# Patient Record
Sex: MALE | HISPANIC OR LATINO | Employment: UNEMPLOYED | URBAN - METROPOLITAN AREA
[De-identification: names, ages, dates, MRNs, and addresses within clinical notes are randomized per-mention and may not be internally consistent; named-entity substitution may affect disease eponyms.]

---

## 2021-02-28 ENCOUNTER — APPOINTMENT (OUTPATIENT)
Dept: RADIOLOGY | Facility: MEDICAL CENTER | Age: 41
DRG: 987 | End: 2021-02-28
Payer: MEDICAID

## 2021-02-28 ENCOUNTER — HOSPITAL ENCOUNTER (INPATIENT)
Facility: MEDICAL CENTER | Age: 41
LOS: 3 days | DRG: 987 | End: 2021-03-03
Attending: EMERGENCY MEDICINE | Admitting: SURGERY
Payer: MEDICAID

## 2021-02-28 ENCOUNTER — APPOINTMENT (OUTPATIENT)
Dept: RADIOLOGY | Facility: MEDICAL CENTER | Age: 41
DRG: 987 | End: 2021-02-28
Attending: EMERGENCY MEDICINE
Payer: MEDICAID

## 2021-02-28 DIAGNOSIS — S06.6X0A SUBARACHNOID HEMORRHAGE FOLLOWING INJURY, NO LOSS OF CONSCIOUSNESS, INITIAL ENCOUNTER (HCC): ICD-10-CM

## 2021-02-28 PROBLEM — Z11.9 SCREENING EXAMINATION FOR INFECTIOUS DISEASE: Status: ACTIVE | Noted: 2021-02-28

## 2021-02-28 PROBLEM — S01.01XA SCALP LACERATION, INITIAL ENCOUNTER: Status: ACTIVE | Noted: 2021-02-28

## 2021-02-28 PROBLEM — J96.90 RESPIRATORY FAILURE FOLLOWING TRAUMA (HCC): Status: ACTIVE | Noted: 2021-02-28

## 2021-02-28 PROBLEM — S06.6XAA SUBARACHNOID HEMORRHAGE FOLLOWING INJURY (HCC): Status: ACTIVE | Noted: 2021-02-28

## 2021-02-28 PROBLEM — T14.90XA TRAUMA: Status: ACTIVE | Noted: 2021-02-28

## 2021-02-28 PROBLEM — F10.929 ACUTE ALCOHOL INTOXICATION (HCC): Status: ACTIVE | Noted: 2021-02-28

## 2021-02-28 PROBLEM — Z53.09 CONTRAINDICATION TO DEEP VEIN THROMBOSIS (DVT) PROPHYLAXIS: Status: ACTIVE | Noted: 2021-02-28

## 2021-02-28 LAB
ABO + RH BLD: NORMAL
ABO GROUP BLD: NORMAL
ACTION RANGE TRIGGERED IACRT: YES
ACTION RANGE TRIGGERED IACRT: YES
ALBUMIN SERPL BCP-MCNC: 4.7 G/DL (ref 3.2–4.9)
ALBUMIN/GLOB SERPL: 1.1 G/DL
ALP SERPL-CCNC: 142 U/L (ref 30–99)
ALT SERPL-CCNC: 43 U/L (ref 2–50)
ANION GAP SERPL CALC-SCNC: 19 MMOL/L (ref 7–16)
APTT PPP: 27.6 SEC (ref 24.7–36)
AST SERPL-CCNC: 30 U/L (ref 12–45)
BASE EXCESS BLDA CALC-SCNC: -10 MMOL/L (ref -4–3)
BASE EXCESS BLDA CALC-SCNC: -11 MMOL/L (ref -4–3)
BILIRUB SERPL-MCNC: 0.2 MG/DL (ref 0.1–1.5)
BLD GP AB SCN SERPL QL: NORMAL
BODY TEMPERATURE: ABNORMAL DEGREES
BODY TEMPERATURE: ABNORMAL DEGREES
BUN SERPL-MCNC: 11 MG/DL (ref 8–22)
CALCIUM SERPL-MCNC: 8.8 MG/DL (ref 8.5–10.5)
CFT BLD TEG: 7.5 MIN (ref 5–10)
CHLORIDE SERPL-SCNC: 99 MMOL/L (ref 96–112)
CLOT ANGLE BLD TEG: 49.6 DEGREES (ref 53–72)
CLOT LYSIS 30M P MA LENFR BLD TEG: 0 % (ref 0–8)
CO2 BLDA-SCNC: 18 MMOL/L (ref 20–33)
CO2 BLDA-SCNC: 25 MMOL/L (ref 20–33)
CO2 SERPL-SCNC: 14 MMOL/L (ref 20–33)
CREAT SERPL-MCNC: 0.85 MG/DL (ref 0.5–1.4)
CT.EXTRINSIC BLD ROTEM: 3.5 MIN (ref 1–3)
ERYTHROCYTE [DISTWIDTH] IN BLOOD BY AUTOMATED COUNT: 41.1 FL (ref 35.9–50)
ETHANOL BLD-MCNC: 325.9 MG/DL (ref 0–10)
GLOBULIN SER CALC-MCNC: 4.2 G/DL (ref 1.9–3.5)
GLUCOSE SERPL-MCNC: 349 MG/DL (ref 65–99)
HCO3 BLDA-SCNC: 17 MMOL/L (ref 17–25)
HCO3 BLDA-SCNC: 22.7 MMOL/L (ref 17–25)
HCT VFR BLD AUTO: 54.6 % (ref 42–52)
HGB BLD-MCNC: 18.7 G/DL (ref 14–18)
HOROWITZ INDEX BLDA+IHG-RTO: 107 MM[HG]
HOROWITZ INDEX BLDA+IHG-RTO: 243 MM[HG]
INR PPP: 0.98 (ref 0.87–1.13)
INST. QUALIFIED PATIENT IIQPT: YES
INST. QUALIFIED PATIENT IIQPT: YES
LACTATE BLD-SCNC: 2.9 MMOL/L (ref 0.5–2)
MCF BLD TEG: 61.6 MM (ref 50–70)
MCH RBC QN AUTO: 32.4 PG (ref 27–33)
MCHC RBC AUTO-ENTMCNC: 34.2 G/DL (ref 33.7–35.3)
MCV RBC AUTO: 94.6 FL (ref 81.4–97.8)
O2/TOTAL GAS SETTING VFR VENT: 100 %
O2/TOTAL GAS SETTING VFR VENT: 100 %
PA AA BLD-ACNC: 18.8 %
PA ADP BLD-ACNC: 52.2 %
PCO2 BLDA: 42.2 MMHG (ref 26–37)
PCO2 BLDA: 81.3 MMHG (ref 26–37)
PCO2 TEMP ADJ BLDA: 41 MMHG (ref 26–37)
PH BLDA: 7.05 [PH] (ref 7.4–7.5)
PH BLDA: 7.21 [PH] (ref 7.4–7.5)
PH TEMP ADJ BLDA: 7.22 [PH] (ref 7.4–7.5)
PLATELET # BLD AUTO: 278 K/UL (ref 164–446)
PMV BLD AUTO: 10.6 FL (ref 9–12.9)
PO2 BLDA: 107 MMHG (ref 64–87)
PO2 BLDA: 243 MMHG (ref 64–87)
PO2 TEMP ADJ BLDA: 239 MMHG (ref 64–87)
POTASSIUM SERPL-SCNC: 5.1 MMOL/L (ref 3.6–5.5)
PROT SERPL-MCNC: 8.9 G/DL (ref 6–8.2)
PROTHROMBIN TIME: 13.3 SEC (ref 12–14.6)
RBC # BLD AUTO: 5.77 M/UL (ref 4.7–6.1)
RH BLD: NORMAL
SAO2 % BLDA: 100 % (ref 93–99)
SAO2 % BLDA: 95 % (ref 93–99)
SARS-COV+SARS-COV-2 AG RESP QL IA.RAPID: NOTDETECTED
SODIUM SERPL-SCNC: 132 MMOL/L (ref 135–145)
SPECIMEN DRAWN FROM PATIENT: ABNORMAL
SPECIMEN DRAWN FROM PATIENT: ABNORMAL
SPECIMEN SOURCE: NORMAL
TEG ALGORITHM TGALG: ABNORMAL
WBC # BLD AUTO: 7.7 K/UL (ref 4.8–10.8)

## 2021-02-28 PROCEDURE — U0003 INFECTIOUS AGENT DETECTION BY NUCLEIC ACID (DNA OR RNA); SEVERE ACUTE RESPIRATORY SYNDROME CORONAVIRUS 2 (SARS-COV-2) (CORONAVIRUS DISEASE [COVID-19]), AMPLIFIED PROBE TECHNIQUE, MAKING USE OF HIGH THROUGHPUT TECHNOLOGIES AS DESCRIBED BY CMS-2020-01-R: HCPCS

## 2021-02-28 PROCEDURE — 5A1935Z RESPIRATORY VENTILATION, LESS THAN 24 CONSECUTIVE HOURS: ICD-10-PCS | Performed by: SURGERY

## 2021-02-28 PROCEDURE — U0005 INFEC AGEN DETEC AMPLI PROBE: HCPCS

## 2021-02-28 PROCEDURE — 86901 BLOOD TYPING SEROLOGIC RH(D): CPT

## 2021-02-28 PROCEDURE — 99285 EMERGENCY DEPT VISIT HI MDM: CPT

## 2021-02-28 PROCEDURE — 72125 CT NECK SPINE W/O DYE: CPT

## 2021-02-28 PROCEDURE — 87426 SARSCOV CORONAVIRUS AG IA: CPT

## 2021-02-28 PROCEDURE — 85384 FIBRINOGEN ACTIVITY: CPT

## 2021-02-28 PROCEDURE — 305949 HCHG RED TRAUMA ACT PRE-NOTIFY NO CC

## 2021-02-28 PROCEDURE — 86900 BLOOD TYPING SEROLOGIC ABO: CPT

## 2021-02-28 PROCEDURE — 94002 VENT MGMT INPAT INIT DAY: CPT

## 2021-02-28 PROCEDURE — 302214 INTUBATION BOX: Performed by: EMERGENCY MEDICINE

## 2021-02-28 PROCEDURE — 85347 COAGULATION TIME ACTIVATED: CPT

## 2021-02-28 PROCEDURE — 86850 RBC ANTIBODY SCREEN: CPT

## 2021-02-28 PROCEDURE — 85610 PROTHROMBIN TIME: CPT

## 2021-02-28 PROCEDURE — 700105 HCHG RX REV CODE 258: Performed by: SURGERY

## 2021-02-28 PROCEDURE — 71045 X-RAY EXAM CHEST 1 VIEW: CPT

## 2021-02-28 PROCEDURE — 83605 ASSAY OF LACTIC ACID: CPT

## 2021-02-28 PROCEDURE — 85730 THROMBOPLASTIN TIME PARTIAL: CPT

## 2021-02-28 PROCEDURE — 770022 HCHG ROOM/CARE - ICU (200)

## 2021-02-28 PROCEDURE — 70450 CT HEAD/BRAIN W/O DYE: CPT

## 2021-02-28 PROCEDURE — 36600 WITHDRAWAL OF ARTERIAL BLOOD: CPT

## 2021-02-28 PROCEDURE — 0HQ0XZZ REPAIR SCALP SKIN, EXTERNAL APPROACH: ICD-10-PCS | Performed by: SURGERY

## 2021-02-28 PROCEDURE — 85027 COMPLETE CBC AUTOMATED: CPT

## 2021-02-28 PROCEDURE — 80053 COMPREHEN METABOLIC PANEL: CPT

## 2021-02-28 PROCEDURE — 700111 HCHG RX REV CODE 636 W/ 250 OVERRIDE (IP): Performed by: SURGERY

## 2021-02-28 PROCEDURE — 82077 ASSAY SPEC XCP UR&BREATH IA: CPT

## 2021-02-28 PROCEDURE — 82803 BLOOD GASES ANY COMBINATION: CPT | Mod: 91

## 2021-02-28 PROCEDURE — 0J900ZZ DRAINAGE OF SCALP SUBCUTANEOUS TISSUE AND FASCIA, OPEN APPROACH: ICD-10-PCS | Performed by: SURGERY

## 2021-02-28 PROCEDURE — 85576 BLOOD PLATELET AGGREGATION: CPT

## 2021-02-28 RX ORDER — ONDANSETRON 2 MG/ML
4 INJECTION INTRAMUSCULAR; INTRAVENOUS EVERY 4 HOURS PRN
Status: DISCONTINUED | OUTPATIENT
Start: 2021-02-28 | End: 2021-03-03 | Stop reason: HOSPADM

## 2021-02-28 RX ORDER — ENEMA 19; 7 G/133ML; G/133ML
1 ENEMA RECTAL
Status: DISCONTINUED | OUTPATIENT
Start: 2021-02-28 | End: 2021-03-03 | Stop reason: HOSPADM

## 2021-02-28 RX ORDER — OXYCODONE HYDROCHLORIDE 5 MG/1
5 TABLET ORAL
Status: DISCONTINUED | OUTPATIENT
Start: 2021-02-28 | End: 2021-03-03 | Stop reason: HOSPADM

## 2021-02-28 RX ORDER — LABETALOL HYDROCHLORIDE 5 MG/ML
10 INJECTION, SOLUTION INTRAVENOUS EVERY 4 HOURS PRN
Status: DISCONTINUED | OUTPATIENT
Start: 2021-02-28 | End: 2021-03-02

## 2021-02-28 RX ORDER — FAMOTIDINE 20 MG/1
20 TABLET, FILM COATED ORAL 2 TIMES DAILY
Status: DISCONTINUED | OUTPATIENT
Start: 2021-02-28 | End: 2021-02-28

## 2021-02-28 RX ORDER — AMOXICILLIN 250 MG
1 CAPSULE ORAL NIGHTLY
Status: DISCONTINUED | OUTPATIENT
Start: 2021-02-28 | End: 2021-03-03 | Stop reason: HOSPADM

## 2021-02-28 RX ORDER — FAMOTIDINE 20 MG/1
20 TABLET, FILM COATED ORAL 2 TIMES DAILY
Status: DISCONTINUED | OUTPATIENT
Start: 2021-02-28 | End: 2021-03-02

## 2021-02-28 RX ORDER — PROPOFOL 10 MG/ML
200 INJECTION, EMULSION INTRAVENOUS ONCE
Status: DISCONTINUED | OUTPATIENT
Start: 2021-02-28 | End: 2021-02-28

## 2021-02-28 RX ORDER — BISACODYL 10 MG
10 SUPPOSITORY, RECTAL RECTAL
Status: DISCONTINUED | OUTPATIENT
Start: 2021-02-28 | End: 2021-03-03 | Stop reason: HOSPADM

## 2021-02-28 RX ORDER — MORPHINE SULFATE 4 MG/ML
4 INJECTION, SOLUTION INTRAMUSCULAR; INTRAVENOUS
Status: DISCONTINUED | OUTPATIENT
Start: 2021-02-28 | End: 2021-03-02

## 2021-02-28 RX ORDER — SODIUM CHLORIDE 9 MG/ML
INJECTION, SOLUTION INTRAVENOUS CONTINUOUS
Status: DISCONTINUED | OUTPATIENT
Start: 2021-02-28 | End: 2021-03-02

## 2021-02-28 RX ORDER — AMOXICILLIN 250 MG
1 CAPSULE ORAL
Status: DISCONTINUED | OUTPATIENT
Start: 2021-02-28 | End: 2021-03-03 | Stop reason: HOSPADM

## 2021-02-28 RX ORDER — OXYCODONE HYDROCHLORIDE 10 MG/1
10 TABLET ORAL
Status: DISCONTINUED | OUTPATIENT
Start: 2021-02-28 | End: 2021-03-02

## 2021-02-28 RX ORDER — DOCUSATE SODIUM 100 MG/1
100 CAPSULE, LIQUID FILLED ORAL 2 TIMES DAILY
Status: DISCONTINUED | OUTPATIENT
Start: 2021-02-28 | End: 2021-03-03 | Stop reason: HOSPADM

## 2021-02-28 RX ORDER — POLYETHYLENE GLYCOL 3350 17 G/17G
1 POWDER, FOR SOLUTION ORAL 2 TIMES DAILY
Status: DISCONTINUED | OUTPATIENT
Start: 2021-02-28 | End: 2021-03-03 | Stop reason: HOSPADM

## 2021-02-28 RX ADMIN — MORPHINE SULFATE 4 MG: 4 INJECTION INTRAVENOUS at 21:53

## 2021-02-28 RX ADMIN — FAMOTIDINE 20 MG: 10 INJECTION INTRAVENOUS at 21:48

## 2021-02-28 RX ADMIN — PROPOFOL 10 MCG/KG/MIN: 10 INJECTION, EMULSION INTRAVENOUS at 21:15

## 2021-02-28 RX ADMIN — SODIUM CHLORIDE: 9 INJECTION, SOLUTION INTRAVENOUS at 21:47

## 2021-02-28 ASSESSMENT — PAIN DESCRIPTION - PAIN TYPE
TYPE: ACUTE PAIN

## 2021-02-28 ASSESSMENT — FIBROSIS 4 INDEX: FIB4 SCORE: 0.69

## 2021-03-01 ENCOUNTER — APPOINTMENT (OUTPATIENT)
Dept: RADIOLOGY | Facility: MEDICAL CENTER | Age: 41
DRG: 987 | End: 2021-03-01
Attending: SURGERY
Payer: MEDICAID

## 2021-03-01 ENCOUNTER — APPOINTMENT (OUTPATIENT)
Dept: RADIOLOGY | Facility: MEDICAL CENTER | Age: 41
DRG: 987 | End: 2021-03-01
Attending: NEUROLOGICAL SURGERY
Payer: MEDICAID

## 2021-03-01 PROBLEM — E11.9 DIABETES (HCC): Status: ACTIVE | Noted: 2021-03-01

## 2021-03-01 PROBLEM — R73.9 HYPERGLYCEMIA: Status: ACTIVE | Noted: 2021-03-01

## 2021-03-01 PROBLEM — J98.11 BILATERAL ATELECTASIS: Status: ACTIVE | Noted: 2021-03-01

## 2021-03-01 LAB
ACTION RANGE TRIGGERED IACRT: YES
ALBUMIN SERPL BCP-MCNC: 3.8 G/DL (ref 3.2–4.9)
ALBUMIN/GLOB SERPL: 1.3 G/DL
ALP SERPL-CCNC: 75 U/L (ref 30–99)
ALT SERPL-CCNC: 38 U/L (ref 2–50)
ANION GAP SERPL CALC-SCNC: 17 MMOL/L (ref 7–16)
AST SERPL-CCNC: 31 U/L (ref 12–45)
BASE EXCESS BLDA CALC-SCNC: -9 MMOL/L (ref -4–3)
BASOPHILS # BLD AUTO: 0.2 % (ref 0–1.8)
BASOPHILS # BLD AUTO: 0.5 % (ref 0–1.8)
BASOPHILS # BLD: 0.03 K/UL (ref 0–0.12)
BASOPHILS # BLD: 0.11 K/UL (ref 0–0.12)
BILIRUB SERPL-MCNC: 0.2 MG/DL (ref 0.1–1.5)
BODY TEMPERATURE: ABNORMAL DEGREES
BUN SERPL-MCNC: 11 MG/DL (ref 8–22)
CALCIUM SERPL-MCNC: 7.6 MG/DL (ref 8.5–10.5)
CHLORIDE SERPL-SCNC: 105 MMOL/L (ref 96–112)
CO2 BLDA-SCNC: 18 MMOL/L (ref 20–33)
CO2 SERPL-SCNC: 16 MMOL/L (ref 20–33)
CREAT SERPL-MCNC: 1.11 MG/DL (ref 0.5–1.4)
EOSINOPHIL # BLD AUTO: 0 K/UL (ref 0–0.51)
EOSINOPHIL # BLD AUTO: 0.01 K/UL (ref 0–0.51)
EOSINOPHIL NFR BLD: 0 % (ref 0–6.9)
EOSINOPHIL NFR BLD: 0 % (ref 0–6.9)
ERYTHROCYTE [DISTWIDTH] IN BLOOD BY AUTOMATED COUNT: 42.5 FL (ref 35.9–50)
ERYTHROCYTE [DISTWIDTH] IN BLOOD BY AUTOMATED COUNT: 42.5 FL (ref 35.9–50)
GLOBULIN SER CALC-MCNC: 2.9 G/DL (ref 1.9–3.5)
GLUCOSE BLD-MCNC: 206 MG/DL (ref 65–99)
GLUCOSE BLD-MCNC: 221 MG/DL (ref 65–99)
GLUCOSE BLD-MCNC: 256 MG/DL (ref 65–99)
GLUCOSE BLD-MCNC: 333 MG/DL (ref 65–99)
GLUCOSE SERPL-MCNC: 262 MG/DL (ref 65–99)
HCO3 BLDA-SCNC: 16.5 MMOL/L (ref 17–25)
HCT VFR BLD AUTO: 43.6 % (ref 42–52)
HCT VFR BLD AUTO: 48.6 % (ref 42–52)
HGB BLD-MCNC: 14.7 G/DL (ref 14–18)
HGB BLD-MCNC: 16.8 G/DL (ref 14–18)
HOROWITZ INDEX BLDA+IHG-RTO: 303 MM[HG]
IMM GRANULOCYTES # BLD AUTO: 0.13 K/UL (ref 0–0.11)
IMM GRANULOCYTES # BLD AUTO: 0.35 K/UL (ref 0–0.11)
IMM GRANULOCYTES NFR BLD AUTO: 0.8 % (ref 0–0.9)
IMM GRANULOCYTES NFR BLD AUTO: 1.5 % (ref 0–0.9)
INST. QUALIFIED PATIENT IIQPT: YES
LYMPHOCYTES # BLD AUTO: 1.64 K/UL (ref 1–4.8)
LYMPHOCYTES # BLD AUTO: 2.81 K/UL (ref 1–4.8)
LYMPHOCYTES NFR BLD: 11.9 % (ref 22–41)
LYMPHOCYTES NFR BLD: 9.8 % (ref 22–41)
MAGNESIUM SERPL-MCNC: 2 MG/DL (ref 1.5–2.5)
MCH RBC QN AUTO: 32.4 PG (ref 27–33)
MCH RBC QN AUTO: 33.1 PG (ref 27–33)
MCHC RBC AUTO-ENTMCNC: 33.7 G/DL (ref 33.7–35.3)
MCHC RBC AUTO-ENTMCNC: 34.6 G/DL (ref 33.7–35.3)
MCV RBC AUTO: 95.7 FL (ref 81.4–97.8)
MCV RBC AUTO: 96 FL (ref 81.4–97.8)
MONOCYTES # BLD AUTO: 1.1 K/UL (ref 0–0.85)
MONOCYTES # BLD AUTO: 1.59 K/UL (ref 0–0.85)
MONOCYTES NFR BLD AUTO: 6.6 % (ref 0–13.4)
MONOCYTES NFR BLD AUTO: 6.7 % (ref 0–13.4)
NEUTROPHILS # BLD AUTO: 13.86 K/UL (ref 1.82–7.42)
NEUTROPHILS # BLD AUTO: 18.76 K/UL (ref 1.82–7.42)
NEUTROPHILS NFR BLD: 79.4 % (ref 44–72)
NEUTROPHILS NFR BLD: 82.6 % (ref 44–72)
NRBC # BLD AUTO: 0 K/UL
NRBC # BLD AUTO: 0 K/UL
NRBC BLD-RTO: 0 /100 WBC
NRBC BLD-RTO: 0 /100 WBC
O2/TOTAL GAS SETTING VFR VENT: 60 %
PCO2 BLDA: 34.9 MMHG (ref 26–37)
PCO2 TEMP ADJ BLDA: 34.6 MMHG (ref 26–37)
PH BLDA: 7.28 [PH] (ref 7.4–7.5)
PH TEMP ADJ BLDA: 7.29 [PH] (ref 7.4–7.5)
PHOSPHATE SERPL-MCNC: 3.9 MG/DL (ref 2.5–4.5)
PLATELET # BLD AUTO: 257 K/UL (ref 164–446)
PLATELET # BLD AUTO: 324 K/UL (ref 164–446)
PMV BLD AUTO: 10.3 FL (ref 9–12.9)
PMV BLD AUTO: 10.5 FL (ref 9–12.9)
PO2 BLDA: 182 MMHG (ref 64–87)
PO2 TEMP ADJ BLDA: 181 MMHG (ref 64–87)
POTASSIUM SERPL-SCNC: 4.3 MMOL/L (ref 3.6–5.5)
PROT SERPL-MCNC: 6.7 G/DL (ref 6–8.2)
RBC # BLD AUTO: 4.54 M/UL (ref 4.7–6.1)
RBC # BLD AUTO: 5.08 M/UL (ref 4.7–6.1)
SAO2 % BLDA: 99 % (ref 93–99)
SARS-COV-2 RNA RESP QL NAA+PROBE: NOTDETECTED
SODIUM SERPL-SCNC: 138 MMOL/L (ref 135–145)
SPECIMEN DRAWN FROM PATIENT: ABNORMAL
SPECIMEN SOURCE: NORMAL
WBC # BLD AUTO: 16.8 K/UL (ref 4.8–10.8)
WBC # BLD AUTO: 23.6 K/UL (ref 4.8–10.8)

## 2021-03-01 PROCEDURE — 85025 COMPLETE CBC W/AUTO DIFF WBC: CPT

## 2021-03-01 PROCEDURE — 71045 X-RAY EXAM CHEST 1 VIEW: CPT

## 2021-03-01 PROCEDURE — 700102 HCHG RX REV CODE 250 W/ 637 OVERRIDE(OP): Performed by: SURGERY

## 2021-03-01 PROCEDURE — 82962 GLUCOSE BLOOD TEST: CPT

## 2021-03-01 PROCEDURE — 94003 VENT MGMT INPAT SUBQ DAY: CPT

## 2021-03-01 PROCEDURE — 94150 VITAL CAPACITY TEST: CPT

## 2021-03-01 PROCEDURE — 83735 ASSAY OF MAGNESIUM: CPT

## 2021-03-01 PROCEDURE — 700117 HCHG RX CONTRAST REV CODE 255: Performed by: NEUROLOGICAL SURGERY

## 2021-03-01 PROCEDURE — 70496 CT ANGIOGRAPHY HEAD: CPT

## 2021-03-01 PROCEDURE — A9270 NON-COVERED ITEM OR SERVICE: HCPCS | Performed by: SURGERY

## 2021-03-01 PROCEDURE — 700111 HCHG RX REV CODE 636 W/ 250 OVERRIDE (IP): Performed by: SURGERY

## 2021-03-01 PROCEDURE — 84100 ASSAY OF PHOSPHORUS: CPT

## 2021-03-01 PROCEDURE — 71260 CT THORAX DX C+: CPT

## 2021-03-01 PROCEDURE — 770022 HCHG ROOM/CARE - ICU (200)

## 2021-03-01 PROCEDURE — 700105 HCHG RX REV CODE 258: Performed by: SURGERY

## 2021-03-01 PROCEDURE — 82803 BLOOD GASES ANY COMBINATION: CPT

## 2021-03-01 PROCEDURE — 94799 UNLISTED PULMONARY SVC/PX: CPT

## 2021-03-01 PROCEDURE — 80053 COMPREHEN METABOLIC PANEL: CPT

## 2021-03-01 PROCEDURE — 99291 CRITICAL CARE FIRST HOUR: CPT | Performed by: SURGERY

## 2021-03-01 PROCEDURE — 700101 HCHG RX REV CODE 250: Performed by: SURGERY

## 2021-03-01 PROCEDURE — 36600 WITHDRAWAL OF ARTERIAL BLOOD: CPT

## 2021-03-01 RX ORDER — CHLORDIAZEPOXIDE HYDROCHLORIDE 25 MG/1
50 CAPSULE, GELATIN COATED ORAL EVERY 6 HOURS
Status: COMPLETED | OUTPATIENT
Start: 2021-03-01 | End: 2021-03-02

## 2021-03-01 RX ORDER — CHLORDIAZEPOXIDE HYDROCHLORIDE 25 MG/1
25 CAPSULE, GELATIN COATED ORAL EVERY 6 HOURS
Status: DISCONTINUED | OUTPATIENT
Start: 2021-03-02 | End: 2021-03-03 | Stop reason: HOSPADM

## 2021-03-01 RX ORDER — LORAZEPAM 2 MG/ML
4 INJECTION INTRAMUSCULAR
Status: DISCONTINUED | OUTPATIENT
Start: 2021-03-01 | End: 2021-03-02

## 2021-03-01 RX ORDER — TAMSULOSIN HYDROCHLORIDE 0.4 MG/1
0.4 CAPSULE ORAL
Status: DISCONTINUED | OUTPATIENT
Start: 2021-03-01 | End: 2021-03-03 | Stop reason: HOSPADM

## 2021-03-01 RX ORDER — LORAZEPAM 2 MG/ML
2 INJECTION INTRAMUSCULAR
Status: DISCONTINUED | OUTPATIENT
Start: 2021-03-01 | End: 2021-03-02

## 2021-03-01 RX ORDER — SODIUM CHLORIDE 9 MG/ML
1000 INJECTION, SOLUTION INTRAVENOUS ONCE
Status: COMPLETED | OUTPATIENT
Start: 2021-03-01 | End: 2021-03-01

## 2021-03-01 RX ORDER — MIDAZOLAM HYDROCHLORIDE 1 MG/ML
1-5 INJECTION INTRAMUSCULAR; INTRAVENOUS
Status: DISCONTINUED | OUTPATIENT
Start: 2021-03-01 | End: 2021-03-01

## 2021-03-01 RX ORDER — LORAZEPAM 2 MG/ML
1 INJECTION INTRAMUSCULAR
Status: DISCONTINUED | OUTPATIENT
Start: 2021-03-01 | End: 2021-03-02

## 2021-03-01 RX ORDER — LORAZEPAM 2 MG/ML
3 INJECTION INTRAMUSCULAR
Status: DISCONTINUED | OUTPATIENT
Start: 2021-03-01 | End: 2021-03-02

## 2021-03-01 RX ORDER — DEXTROSE MONOHYDRATE 25 G/50ML
50 INJECTION, SOLUTION INTRAVENOUS
Status: DISCONTINUED | OUTPATIENT
Start: 2021-03-01 | End: 2021-03-02

## 2021-03-01 RX ADMIN — INSULIN HUMAN 5 UNITS: 100 INJECTION, SOLUTION PARENTERAL at 05:30

## 2021-03-01 RX ADMIN — INSULIN HUMAN 3 UNITS: 100 INJECTION, SOLUTION PARENTERAL at 12:09

## 2021-03-01 RX ADMIN — FAMOTIDINE 20 MG: 20 TABLET ORAL at 17:13

## 2021-03-01 RX ADMIN — MORPHINE SULFATE 4 MG: 4 INJECTION INTRAVENOUS at 05:32

## 2021-03-01 RX ADMIN — OXYCODONE 5 MG: 5 TABLET ORAL at 15:46

## 2021-03-01 RX ADMIN — MORPHINE SULFATE 4 MG: 4 INJECTION INTRAVENOUS at 03:44

## 2021-03-01 RX ADMIN — DOCUSATE SODIUM 50 MG AND SENNOSIDES 8.6 MG 1 TABLET: 8.6; 5 TABLET, FILM COATED ORAL at 17:09

## 2021-03-01 RX ADMIN — INSULIN HUMAN 3 UNITS: 100 INJECTION, SOLUTION PARENTERAL at 17:14

## 2021-03-01 RX ADMIN — FAMOTIDINE 20 MG: 10 INJECTION INTRAVENOUS at 05:32

## 2021-03-01 RX ADMIN — MIDAZOLAM HYDROCHLORIDE 5 MG: 1 INJECTION, SOLUTION INTRAMUSCULAR; INTRAVENOUS at 05:32

## 2021-03-01 RX ADMIN — INSULIN HUMAN 6 UNITS: 100 INJECTION, SOLUTION PARENTERAL at 00:49

## 2021-03-01 RX ADMIN — PROPOFOL 50 MCG/KG/MIN: 10 INJECTION, EMULSION INTRAVENOUS at 05:41

## 2021-03-01 RX ADMIN — PROPOFOL 25 MCG/KG/MIN: 10 INJECTION, EMULSION INTRAVENOUS at 00:52

## 2021-03-01 RX ADMIN — TAMSULOSIN HYDROCHLORIDE 0.4 MG: 0.4 CAPSULE ORAL at 15:46

## 2021-03-01 RX ADMIN — MIDAZOLAM HYDROCHLORIDE 5 MG: 1 INJECTION, SOLUTION INTRAMUSCULAR; INTRAVENOUS at 00:42

## 2021-03-01 RX ADMIN — MORPHINE SULFATE 4 MG: 4 INJECTION INTRAVENOUS at 02:09

## 2021-03-01 RX ADMIN — MIDAZOLAM HYDROCHLORIDE 5 MG: 1 INJECTION, SOLUTION INTRAMUSCULAR; INTRAVENOUS at 04:23

## 2021-03-01 RX ADMIN — SODIUM CHLORIDE 1000 ML: 9 INJECTION, SOLUTION INTRAVENOUS at 00:40

## 2021-03-01 RX ADMIN — CHLORDIAZEPOXIDE HYDROCHLORIDE 50 MG: 25 CAPSULE ORAL at 12:13

## 2021-03-01 RX ADMIN — SODIUM CHLORIDE 1000 ML: 9 INJECTION, SOLUTION INTRAVENOUS at 00:53

## 2021-03-01 RX ADMIN — IOHEXOL 100 ML: 350 INJECTION, SOLUTION INTRAVENOUS at 01:12

## 2021-03-01 RX ADMIN — MIDAZOLAM HYDROCHLORIDE 5 MG: 1 INJECTION, SOLUTION INTRAMUSCULAR; INTRAVENOUS at 08:34

## 2021-03-01 RX ADMIN — CHLORDIAZEPOXIDE HYDROCHLORIDE 50 MG: 25 CAPSULE ORAL at 17:13

## 2021-03-01 RX ADMIN — POLYETHYLENE GLYCOL 3350 1 PACKET: 17 POWDER, FOR SOLUTION ORAL at 17:13

## 2021-03-01 RX ADMIN — OXYCODONE 5 MG: 5 TABLET ORAL at 19:50

## 2021-03-01 RX ADMIN — OXYCODONE 5 MG: 5 TABLET ORAL at 23:51

## 2021-03-01 RX ADMIN — LORAZEPAM 4 MG: 2 INJECTION INTRAMUSCULAR; INTRAVENOUS at 10:30

## 2021-03-01 RX ADMIN — PROPOFOL 70 MCG/KG/MIN: 10 INJECTION, EMULSION INTRAVENOUS at 08:33

## 2021-03-01 RX ADMIN — MORPHINE SULFATE 4 MG: 4 INJECTION INTRAVENOUS at 07:30

## 2021-03-01 RX ADMIN — SODIUM CHLORIDE 1000 ML: 9 INJECTION, SOLUTION INTRAVENOUS at 02:57

## 2021-03-01 RX ADMIN — MIDAZOLAM HYDROCHLORIDE 2 MG: 1 INJECTION, SOLUTION INTRAMUSCULAR; INTRAVENOUS at 02:06

## 2021-03-01 RX ADMIN — SODIUM CHLORIDE: 9 INJECTION, SOLUTION INTRAVENOUS at 19:50

## 2021-03-01 RX ADMIN — CHLORDIAZEPOXIDE HYDROCHLORIDE 50 MG: 25 CAPSULE ORAL at 23:51

## 2021-03-01 ASSESSMENT — LIFESTYLE VARIABLES
VISUAL DISTURBANCES: NOT PRESENT
TOTAL SCORE: MODERATE ITCHING, PINS AND NEEDLES SENSATION, BURNING OR NUMBNESS
TOTAL SCORE: 3
HEADACHE, FULLNESS IN HEAD: NOT PRESENT
AUDITORY DISTURBANCES: MODERATE HARSHNESS OR ABILITY TO FRIGHTEN
HEADACHE, FULLNESS IN HEAD: MODERATELY SEVERE
NAUSEA AND VOMITING: *
ORIENTATION AND CLOUDING OF SENSORIUM: CANNOT DO SERIAL ADDITIONS OR IS UNCERTAIN ABOUT DATE
ORIENTATION AND CLOUDING OF SENSORIUM: DISORIENTED FOR PLACE AND / OR PERSON
VISUAL DISTURBANCES: MODERATELY SEVERE HALLUCINATIONS
AUDITORY DISTURBANCES: NOT PRESENT
AGITATION: NORMAL ACTIVITY
TREMOR: TREMOR NOT VISIBLE BUT CAN BE FELT, FINGERTIP TO FINGERTIP
PAROXYSMAL SWEATS: *
AGITATION: NORMAL ACTIVITY
AGITATION: *
PAROXYSMAL SWEATS: NO SWEAT VISIBLE
TREMOR: *
TOTAL SCORE: 3
AUDITORY DISTURBANCES: NOT PRESENT
NAUSEA AND VOMITING: NO NAUSEA AND NO VOMITING
PAROXYSMAL SWEATS: NO SWEAT VISIBLE
VISUAL DISTURBANCES: NOT PRESENT
HEADACHE, FULLNESS IN HEAD: NOT PRESENT
ORIENTATION AND CLOUDING OF SENSORIUM: CANNOT DO SERIAL ADDITIONS OR IS UNCERTAIN ABOUT DATE
ANXIETY: *
NAUSEA AND VOMITING: NO NAUSEA AND NO VOMITING
ANXIETY: MILDLY ANXIOUS
TREMOR: TREMOR NOT VISIBLE BUT CAN BE FELT, FINGERTIP TO FINGERTIP
ANXIETY: MILDLY ANXIOUS
TOTAL SCORE: 37

## 2021-03-01 ASSESSMENT — PAIN DESCRIPTION - PAIN TYPE
TYPE: ACUTE PAIN

## 2021-03-01 ASSESSMENT — PULMONARY FUNCTION TESTS: FVC: 1

## 2021-03-01 ASSESSMENT — FIBROSIS 4 INDEX: FIB4 SCORE: 0.82

## 2021-03-01 ASSESSMENT — ENCOUNTER SYMPTOMS
HEADACHES: 1
TREMORS: 0
WEAKNESS: 0
RESPIRATORY NEGATIVE: 1
DIZZINESS: 1
GASTROINTESTINAL NEGATIVE: 1
NUMBNESS: 0

## 2021-03-01 ASSESSMENT — PATIENT HEALTH QUESTIONNAIRE - PHQ9
1. LITTLE INTEREST OR PLEASURE IN DOING THINGS: NOT AT ALL
2. FEELING DOWN, DEPRESSED, IRRITABLE, OR HOPELESS: NOT AT ALL
SUM OF ALL RESPONSES TO PHQ9 QUESTIONS 1 AND 2: 0

## 2021-03-01 NOTE — PROGRESS NOTES
Per Telephone with Dr Humphrey, pt may transfer to oncology, maintain NPO status, D/C albumin, do NOT give dulcolax supp at this time.

## 2021-03-01 NOTE — PROGRESS NOTES
Trauma Progress Note 3/1/2021 5:31 AM    This is a 40 y.o. male who reportedly fell after exiting his car in the parking lot striking his head. He is intoxicated. EMS intubated the patient on the scene for his altered mental status. He sustained small subarachnoid hemorrhage.    Plan:   - continue neuro checks     Assessment: intubated, sedated, agitated off sedation, following commands at times    /59   Pulse (!) 107   Temp 36.6 °C (97.8 °F) (Temporal)   Resp (!) 21   Ht 1.829 m (6')   Wt 102 kg (223 lb 15.8 oz)   SpO2 97%   BMI 30.38 kg/m²     Hemoglobin: 14.7 g/dL  Hematocrit: 43.6 %    Lactic Acid: From 2.9 mmol/L to pending     Urine Output: ovalles catheter / adequate output    Arterial Blood Gas:  Recent Labs     02/28/21  2133 02/28/21  2329 03/01/21  0336   ISTATAPH 7.053* 7.212* 7.283*   ISTATAPCO2 81.3* 42.2* 34.9   ISTATAPO2 107* 243* 182*   ISTATATCO2 25 18* 18*   TRFEOFT3ITW 95 100* 99   ISTATARTHCO3 22.7 17.0 16.5*   ISTATARTBE -10* -11* -9*   ISTATTEMP see below 97.4 F 98.2 F   ISTATFIO2 100 100 60   ISTATSPEC Arterial Arterial Arterial   ISTATAPHTC  --  7.221* 7.286*   GMYBOPMD4TG  --  239* 181*     Recent Labs     02/28/21 2054   APTT 27.6   INR 0.98      Recent Labs     02/28/21 2059   REACTMIN 7.5   CLOTKINET 3.5*   CLOTANGL 49.6*   MAXCLOTS 61.6   BHY69GHB 0.0   PRCINADP 52.2   PRCINAA 18.8       Subarachnoid hemorrhage following injury (HCC)- (present on admission)  Assessment & Plan  Subtle subarachnoid hemorrhage or minimal contusion in left vertex.  Non-operative management.   Follow up CTA of brain with slightly more pronounced SAH  Post traumatic pharmacologic seizure prophylaxis for 1 week.  Speech Language Pathology cognitive evaluation.  Porfirio Lea MD. Neurosurgeon. [facility].    Respiratory failure following trauma (HCC)- (present on admission)  Assessment & Plan  Intubated at the scene for low GCS.  Continue full mechanical ventilatory support.  Ventilator bundle and Trauma weaning protocol.    Hyperglycemia- (present on admission)  Assessment & Plan  Admission plasma glucose 349.  Unknown history at this time.  Insulin sliding scale.    Bilateral atelectasis- (present on admission)  Assessment & Plan  Atelectasis in the right upper lobe and left lung base.  Supplemental oxygen to maintain O2 saturations greater than 95%  Aggressive pulmonary hygiene. Serial chest radiographs.    Diabetes (HCC)  Assessment & Plan  Elevated blood glucose; history unknown  Medium sliding scale insulin ordered    Acute alcohol intoxication (HCC)- (present on admission)  Assessment & Plan  Admission blood alcohol level of 325.9.  Trauma alcohol withdrawal protocol initiated.  Alcohol withdrawal surveillance.    Scalp laceration, initial encounter- (present on admission)  Assessment & Plan  Posterior scalp hematoma and laceration at the left vertex.  Sutured in ICU.  Suture removal in 7 days (3/7)    Contraindication to deep vein thrombosis (DVT) prophylaxis- (present on admission)  Assessment & Plan  Prophylactic anticoagulation for thrombotic prevention initially contraindicated secondary to elevated bleeding risk.  3/1 Trauma surveillance venous duplex scanning ordered.    Screening examination for infectious disease- (present on admission)  Assessment & Plan  2/28 COVID-19 specimen sent. AIRBORNE & CONTACT/EYE ISOLATION implemented pending final SARS-CoV-2 testing.    Trauma- (present on admission)  Assessment & Plan  Ground level fall.  Trauma Red Activation.  Michael Clark MD. Trauma Surgery.

## 2021-03-01 NOTE — PROGRESS NOTES
Call from Dr. Clark regarding new orders placed for patient. STAT CBC to be drawn. 1L NS bolus STAT. Orders for STAT CT chest/abdomen/pelvis to be completed. Updates regarding patient SBP in 60's unable to be weaned off propofol due to agitation. Orders for versed received.

## 2021-03-01 NOTE — ASSESSMENT & PLAN NOTE
Posterior scalp hematoma and laceration at the left vertex.  Sutured in ICU.  Suture removal in 7 days (3/7)

## 2021-03-01 NOTE — CARE PLAN
Problem: Communication  Goal: The ability to communicate needs accurately and effectively will improve  Outcome: PROGRESSING AS EXPECTED     Problem: Safety  Goal: Will remain free from injury  Outcome: PROGRESSING AS EXPECTED  Goal: Will remain free from falls  Outcome: PROGRESSING AS EXPECTED     Problem: Pain Management  Goal: Pain level will decrease to patient's comfort goal  Outcome: PROGRESSING AS EXPECTED     Problem: Respiratory:  Goal: Respiratory status will improve  Outcome: PROGRESSING AS EXPECTED     Problem: Skin Integrity  Goal: Risk for impaired skin integrity will decrease  Outcome: PROGRESSING AS EXPECTED

## 2021-03-01 NOTE — PROGRESS NOTES
Neurosurgery    Intoxicated male who fell and hit his head.   Minimal tsah in the left parietal area.    He is waking up and following.    No neurosurgical intervention  I suspect he can be weaned today to extubate.  Neuro checks to q 4  I am ok with no Keppra at this point  Anticoag if necessary on 3/2

## 2021-03-01 NOTE — PROCEDURES
Procedure Report    Surgeon: Michael Clark MD.     Diagnosis: irregular posterior scalp laceration     Procedure: layered repair of 10cm posterior scalp laceration      Procedure in detail: The procedure took place in the ICU. The patient was sedated with propofol and rolled onto his right side with log roll precautions. The hematoma underlying the laceration was evacuated and multiple figure of eight 2-0 Vicryls were used for hemostasis. Once the bleeding had stopped, the wound was approximated with antoni.      Michael Clark M.D.

## 2021-03-01 NOTE — CARE PLAN
Ventilator Daily Summary    Vent Day #2  APVCMV   20/450/12/50%  Ventilator settings changed this shift: decrease FIO2, and RR post gas    Weaning trials:no     Respiratory Procedures: not at this time    Plan: Continue current ventilator settings and wean mechanical ventilation as tolerated per physician orders.

## 2021-03-01 NOTE — ASSESSMENT & PLAN NOTE
Subtle subarachnoid hemorrhage or minimal contusion in left vertex.  Non-operative management.   Follow up CTA of brain with slightly more pronounced SAH  3/1 Post traumatic pharmacologic seizure prophylaxis not needed  Speech Language Pathology cognitive evaluation recommending outpatient therapies.   No need for follow up with neurosurgery as outpatient.   Porfirio Lea MD. Neurosurgeon. [facility].

## 2021-03-01 NOTE — PROGRESS NOTES
Spoke to Patients wife Vania Hillman and she was updated on patients condition, she wants to be the point of contact and  her Cell phone is [phone].

## 2021-03-01 NOTE — DISCHARGE PLANNING
Medical Social Work    Referral: Trauma Red    Intervention: Pt is an unknown male brought in by [facility] from [facility] after falling out of his vehicle in a parking lot.  Pt arrives with no wallet or other identifying information.  MSW contacted [facility] and they provided pt's name and birthdate: Tico Muñoz (: 1980).  Pt's information provided to PAR.  Pt was located in [facility] with his wife, Fanny ([phone]) as emergency contact; attempted to call phone and it's not in service.  MSW attempted to call home number in pt's chart: [phone], with no answer.  Next of kin search completed with the following possible relatives:     Cyndee Casillas, age 64 ([phone]); number not in service.  Tammi Muñoz, age 35 ([phone]); number not in service.      Unable to locate next of kin/family at this time.    Plan: SW will continue to attempt to locate pt's family.

## 2021-03-01 NOTE — CONSULTS
DATE OF SERVICE:  03/01/2021     CHIEF COMPLAINT:  A 40-year-old gentleman, was drinking last night.  He   apparently fell while attempting to get into his car.  struck the back of his   head.  When he came, he had some seizure-like activity and was given Ativan.    He really was not doing much when he was intubated and sent to the unit, but   he has subsequently gotten lighter.  CT examination pretty much benign except   for some left parietal minimal traumatic subarachnoid hemorrhage.     PAST MEDICAL HISTORY:  Unknown.     MEDICATIONS:  Unknown.     PHYSICAL EXAMINATION:  On examination, pupils are equal, round and reactive to   light.  Extraocular motor functions intact.     To light stimulus, he will move all four extremities.  He will show thumbs up   bilaterally.  He will lift both legs.     IMPRESSION:  Traumatic closed head injury in a gentleman with an alcohol level   of 348.  He also had a 3 cm scalp laceration.     I suspect he can be weaned to extubate, and go to q.4-hour neuro checks, as he   is weaned off his propofol.        ______________________________  MD JAS JOHNSTON/QUITA    DD:  03/01/2021 06:50  DT:  03/01/2021 07:44    Job#:  [id number]

## 2021-03-01 NOTE — ASSESSMENT & PLAN NOTE
Admission blood alcohol level of 325.9.  Trauma alcohol withdrawal protocol initiated.  Alcohol withdrawal surveillance.

## 2021-03-01 NOTE — PROGRESS NOTES
TRAUMA TERTIARY SURVEY     Mental status adequate for full examination?: No    Spine cleared (radiologically and/or clinically): Yes    PHYSICAL EXAMINATION:  Vitals: BP (!) 168/94   Pulse (!) 108   Temp 36.6 °C (97.8 °F) (Temporal)   Resp 18   Ht 1.829 m (6')   Wt 102 kg (223 lb 15.8 oz)   SpO2 100%   BMI 30.38 kg/m²   Constitutional:     General Appearance: mechanically ventilated .  HEENT:    abrasion to nose. The pupils are equal, round, and reactive to light bilaterally.   Neck:    The trachea is midline.  Respiratory:   Inspection: Mechanically ventilated..   Auscultation: clear to auscultation.  Cardiovascular:   Auscultation: tachy.   Peripheral Pulses: Normal.     Genitourinary:   (MALE): normal male external genitalia.    Skin:   The skin is warm and dry.  Neurologic:    [place] Coma Scale (GCS) 3t   IMAGING:  DX-CHEST-PORTABLE (1 VIEW)   Final Result         1.  Pulmonary edema and/or infiltrates are identified, which are stable since the prior exam.      CT-CHEST,ABDOMEN,PELVIS WITH   Final Result         1.  No acute abnormality.   2.  Linear densities in the right upper lobe and left lung base, appearance favors atelectasis, component of infiltrate not definitively excluded.   3.  Hepatomegaly and diffuse hepatic steatosis   4.  Fat-containing bilateral inguinal hernias   5.  Diverticulosis   6.  Atherosclerosis      CT-CTA HEAD WITH & W/O-POST PROCESS   Final Result         1.  No large vessel occlusion or aneurysm identified.   2.  Small subarachnoid hemorrhage or parenchymal contusion in the left parietal lobe near the vertex, slightly more pronounced compared to prior study.      CT-CSPINE WITHOUT PLUS RECONS   Final Result         1.  No acute traumatic bony injury of the cervical spine is apparent.   2.  Right upper lobe consolidation      CT-HEAD W/O   Final Result         1.  Subtle hyperdensity in the left vertex, appearance concerning for subtle subarachnoid hemorrhage or minimal  contusion.      These findings were discussed with the patient's clinician, Cricket Lama, on 2/28/2021 9:25 PM.      DX-CHEST-LIMITED (1 VIEW)   Final Result         1.  New consolidation in the right upper lobe, likely atelectasis.   2.  Improved aeration of the left lung with minimal residual left lung base atelectasis.   3.  Right mainstem intubation at its origin, recommend withdrawal 2 cm      These findings were discussed with the patient's clinician, Dr. Lama, on 2/28/2021 9:24 PM.      DX-CHEST-LIMITED (1 VIEW)   Final Result         1.  Hazy left pulmonary opacities, could represent atelectasis due to right mainstem intubation, infiltrates not excluded.   2.  Right mainstem intubation, recommend withdrawal 4.5 cm.      These findings were discussed with the patient's clinician, Er Triage Protocol, on 2/28/2021 9:10 PM.      US-ABORTED US PROCEDURE    (Results Pending)     All current laboratory studies/radiology exams reviewed: Yes    Completed Consultations:  neurosurgical     Pending Consultations:  NA    Newly Identified Diagnoses and Injuries:  NA    TOTAL RAP SCORE:  RAP Score Total: 10      ETOH Screening     Reason for no ETOH Intervention: Intubated        Recheck tertiary when extubated and engaged in exam  Per family, patient has significant hx of ETOH use

## 2021-03-01 NOTE — ED PROVIDER NOTES
ED Provider Note    CHIEF COMPLAINT  No chief complaint on file.  Altered mental status    [facility]  Los Mckeon is a 89 y.o. adult who presents as a trauma red after the patient was intubated in the field due to altered mental status.  According to bystanders the patient fell while attempting to get to his car and struck the back of his head.  On arrival first responders noted some seizure-like activity and administered Ativan.  The patient subsequently had a GCS of approximately 3 therefore he was intubated in the field and transferred via air care to [facility] [place]note 2].  No other history could be obtained.  There was some note of alcohol abuse per bystanders but the patient cannot give any history.    REVIEW OF SYSTEMS  See HPI for further details.  Unobtainable.     PAST MEDICAL HISTORY  No past medical history on file.    FAMILY HISTORY  [unfilled]    SOCIAL HISTORY  Social History     Socioeconomic History   • Marital status: Not on file     Spouse name: Not on file   • Number of children: Not on file   • Years of education: Not on file   • Highest education level: Not on file   Occupational History   • Not on file   Tobacco Use   • Smoking status: Not on file   Substance and Sexual Activity   • Alcohol use: Not on file   • Drug use: Not on file   • Sexual activity: Not on file   Other Topics Concern   • Not on file   Social History Narrative   • Not on file     Social Determinants of Health     Financial Resource Strain:    • Difficulty of Paying Living Expenses:    Food Insecurity:    • Worried About Running Out of Food in the Last Year:    • Ran Out of Food in the Last Year:    Transportation Needs:    • Lack of Transportation (Medical):    • Lack of Transportation (Non-Medical):    Physical Activity:    • Days of Exercise per Week:    • Minutes of Exercise per Session:    Stress:    • Feeling of Stress :    Social Connections:    • Frequency of Communication with Friends and Family:    • Frequency of  Social Gatherings with Friends and Family:    • Attends Buddhism Services:    • Active Member of Clubs or Organizations:    • Attends Club or Organization Meetings:    • Marital Status:    Intimate Partner Violence:    • Fear of Current or Ex-Partner:    • Emotionally Abused:    • Physically Abused:    • Sexually Abused:        SURGICAL HISTORY  No past surgical history on file.    CURRENT MEDICATIONS  Home Medications    **Home medications have not yet been reviewed for this encounter**         ALLERGIES  Not on File    PHYSICAL EXAM  VITAL SIGNS: BP (!) 174/111   Pulse 130   Temp 36.2 °C (97.2 °F) (Temporal)   Resp 22   Ht 1.829 m (6')   Wt 120 kg (265 lb)   SpO2 92%   BMI 35.94 kg/m²       Constitutional: Intubated and sedated per air care.   HENT: 3 cm left occipital laceration with a large hematoma, Bilateral external ears normal, Oropharynx moist, No oral exudates, Nose normal.   Eyes: Pupils are pinpoint with bilateral conjunctival injection  Neck: Normal range of motion, No tenderness, Supple, No stridor.   Lymphatic: No lymphadenopathy noted.   Cardiovascular: Tachycardic heart rate, Normal rhythm, No murmurs, No rubs, No gallops.   Thorax & Lungs: Minimal breath sounds on the left, good breath sounds on the right, No respiratory distress, No wheezing, No chest tenderness.   Abdomen: Bowel sounds normal, Soft.   Skin: The scalp laceration described above  Back: No tenderness, No CVA tenderness.   Extremities: Intact distal pulses, No edema, No tenderness, No cyanosis, No clubbing.   Neurologic: GCS of 3T  Psychiatric: Affect normal, Judgment normal, Mood normal.       COURSE & MEDICAL DECISION MAKING  Pertinent Labs & Imaging studies reviewed. (See chart for details)  This is a gentleman who presents as a trauma red after a fall.  He does have evidence of a large hematoma and laceration to the occipital aspect of the scalp.  His history is concerning for significant intracranial injury.  The patient  was intubated in the field as mentioned above and on arrival had evidence of a right mainstem intubation.  After the primary survey chest x-ray was performed and confirmed the right mainstem intubation and the endotracheal tube was pulled back approximately 2 cm.  Repeat imaging still shows the endotracheal tube just distal to the jorge and the endotracheal tube was pulled back again about 24 cm any arrived at 29 cm at the lips.  The patient had better aeration.  His blood pressure was controlled with propofol.  The trauma surgeon presented further resuscitation.  Besides the head injury I do not see any other clinical evidence of injury however due to his potential alcohol abuse as well as altered state the history and neurologic exam is limited.  The patient will be sent for CT imaging per trauma services and admitted to the ICU.    FINAL IMPRESSION  1.  Traumatic head injury  2.  3 cm scalp laceration  3.  Critical care time 30 minutes         Electronically signed by: Cricket Lama M.D., 2/28/2021 9:10 PM

## 2021-03-01 NOTE — ASSESSMENT & PLAN NOTE
Atelectasis in the right upper lobe and left lung base.  Supplemental oxygen to maintain O2 saturations greater than 95%  Aggressive pulmonary hygiene. Serial chest radiographs.

## 2021-03-01 NOTE — ASSESSMENT & PLAN NOTE
Admission plasma glucose 349.; history unknown.  Insulin sliding scale  3/2 Glycohemoglobin 8.3.   Diabetic diet. Diabetic educator/nutritionist consult.

## 2021-03-01 NOTE — DISCHARGE PLANNING
"Care Transition Team Assessment    In the case of an emergency, pt's legal NOK is wife, Vonnie Hillman [phone]     LSW met with pt and spouse at bedside and obtained the following information used in this assessment. Updated demographic information obtained and added to face sheet.     Tico is a  40 year old male with three minor children ages 16,14 and 8. Pt lives with his wife and children in a single level house in [place], [place]. Wife reports that patient dos not have any ACP documents and due to pt's clinical status, booklet was unable to be given at this time. Education on AD's were given to spouse.     Prior to current hospitalization, pt was completely independent in ADLS/IADLS. No DME need or prior outside/community services. Pt is employed full time (company unknown) and wife reports that pt's employer is aware of his admission. Wife reports that \"a few days ago\" she submitted Medicaid applications for pt and their family. Patient does not have a PCP.     Spouse openly discussed pt's drinking and reports that he consumes around 3-4 beers a night after work. Wife stated that his drinking after work doesn't affect his daily living activities and that he's able to go to work the next morning and take the kids to school. Wife stated, \"he had a good week last week so he wanted to drink & relax on his day off (yesterday). He drank too much and ended up here but he never drinks and mixes liquor like that.\" LSW enquired if pt has ever voiced wanting to stop drinking or wanting resources and wife stated, no. Wife declined resources at this time and stated, \"I'll talk to him.\" Education provided.     Barriers to dc: Medically complex and no insurance     Plan: Continue to assist with social/dc needs     Care Transition Team Assessment    Information Source  Orientation : Unable to Assess  Information Given By: Spouse  Informant's Name: Vonnie  Who is responsible for making decisions for patient? : " Patient    Readmission Evaluation  Is this a readmission?: No    Elopement Risk  Legal Hold: No  Ambulatory or Self Mobile in Wheelchair: No-Not an Elopement Risk  Elopement Risk: Not at Risk for Elopement    Discharge Preparedness  What is your plan after discharge?: Uncertain - pending medical team collaboration  What are your discharge supports?: Spouse  Prior Functional Level: Ambulatory, Independent with Activities of Daily Living, Independent with Medication Management    Functional Assesment  Prior Functional Level: Ambulatory, Independent with Activities of Daily Living, Independent with Medication Management    Finances  Financial Barriers to Discharge: No  Prescription Coverage: No  Prescription Coverage Comments: No insurance - PFA aware    Advance Directive  Advance Directive?: None  Advance Directive offered?: AD Booklet refused    Psychological Assessment  History of Substance Abuse: Alcohol  Date Last Used - Alcohol: 02/28/2021  Substance Abuse Comments: Family reports extensive alcohol abuse  History of Psychiatric Problems: No  Non-compliant with Treatment: No  Newly Diagnosed Illness: Yes    Discharge Risks or Barriers  Discharge risks or barriers?: No PCP, Uninsured / underinsured, Substance abuse, Complex medical needs  Patient risk factors: Complex medical needs    Anticipated Discharge Information  Discharge Disposition: Still a Patient (30)

## 2021-03-01 NOTE — ASSESSMENT & PLAN NOTE
Prophylactic anticoagulation for thrombotic prevention initially contraindicated secondary to elevated bleeding risk.  3/2 Pharmacological DVT prophylaxis cleared by neurosurgery, initiate.

## 2021-03-01 NOTE — ED NOTES
39 y/o male Fernando Lin found in parking lot after sustaining a GLF. Initial GCS of 8. When EMS arrived, Patient seizing and GCS declined to 3. Patient medicated with 12 of versed and intubated with 125 of rocuronium and 35 of etomidate. 8.0 tube size. Posterior head lac noted. Other medications administered: 100 fentanyl.

## 2021-03-01 NOTE — H&P
Trauma History and Physical  2/28/2021    Attending Physician: Michael Clark MD.     CC: Trauma The patient was triaged as a Trauma Red in accordance with established pre hospital protocols. An expeditious primary and secondary survey with required adjuncts was conducted. See trauma narrator for full details.    HPI: This is an approximately 38 year old man who fell backwards getting into his car, striking his head on the ground. Per report, his initial GCS was 8 and he was quite combative, moved all extremities, and he then had a seizure and was intubated by EMS. He was then [place] to [facility] [place] with normal vital signs.     PMHx, PSHx, outpatient meds, allergies, social history, family history, ROS all unobtainable due to his intubated state    Physical Exam:  /68   Pulse (!) 120   Temp 36.2 °C (97.2 °F) (Temporal)   Resp 20   Ht 1.829 m (6')   Wt 120 kg (265 lb)   SpO2 95%     Constitutional: GCS 3T, chemically paralyzed and sedated.   Head: Large boggy posterior cephalohematoma with irregular 8cm scalp laceration. Pupils 2mm, sluggishly reactive bilaterally. Midface stable.    Neck: No tracheal deviation. No midline cervical spine tenderness. C-collar in place. No cervical seatbelt sign.  Cardiovascular: Normal rate, regular rhythm.  Pulmonary/Chest: Clavicles nontender to palpation. There is any chest wall tenderness bilaterally.  No crepitus. Positive breath sounds bilaterally.   Abdominal: Soft, nondistended. Nontender to palpation. Pelvis is stable to anterior-posterior compression.   Musculoskeletal: Right upper extremity grossly atraumatic, palpable radial pulse.   Left upper extremity grossly atraumatic, palpable radial pulse.  Right lower extremity grossly atraumatic.  2+ DP pulse.  Left  lower extremity grossly atraumatic.  2+ DP pulse.  Back: Midline thoracic and lumbar spines are nontender to palpation. No step-offs. Mild sacral erythema present.  : Normal male external  genitalia. Rectal exam not done. No blood visible at urethral meatus.   Neurological: no response to painful stimuli in all 4 extremities.   Skin: Skin is warm and dry.  No diaphoresis. No erythema. No pallor.   Psychiatric:  Unable to assess       Labs:  Recent Labs     02/28/21 2054   WBC 7.7   RBC 5.77   HEMOGLOBIN 18.7*   HEMATOCRIT 54.6*   MCV 94.6   MCH 32.4   MCHC 34.2   RDW 41.1   PLATELETCT 278   MPV 10.6         Recent Labs     02/28/21 2054   APTT 27.6   INR 0.98     Recent Labs     02/28/21 2054   INR 0.98         Radiology:  CT-CSPINE WITHOUT PLUS RECONS   Final Result         1.  No acute traumatic bony injury of the cervical spine is apparent.   2.  Right upper lobe consolidation      CT-HEAD W/O   Final Result         1.  Subtle hyperdensity in the left vertex, appearance concerning for subtle subarachnoid hemorrhage or minimal contusion.      These findings were discussed with the patient's clinician, Cricket Lama, on 2/28/2021 9:25 PM.      DX-CHEST-LIMITED (1 VIEW)   Final Result         1.  New consolidation in the right upper lobe, likely atelectasis.   2.  Improved aeration of the left lung with minimal residual left lung base atelectasis.   3.  Right mainstem intubation at its origin, recommend withdrawal 2 cm      These findings were discussed with the patient's clinician, Dr. Lama, on 2/28/2021 9:24 PM.      DX-CHEST-LIMITED (1 VIEW)   Final Result         1.  Hazy left pulmonary opacities, could represent atelectasis due to right mainstem intubation, infiltrates not excluded.   2.  Right mainstem intubation, recommend withdrawal 4.5 cm.      These findings were discussed with the patient's clinician, Er Triage Protocol, on 2/28/2021 9:10 PM.      DX-CHEST-PORTABLE (1 VIEW)    (Results Pending)   US-ABORTED US PROCEDURE    (Results Pending)         Assessment: This is an approximately 38 year old man with a traumatic brain injury and respiratory failure. He arrived with a right  mainstem intubation which was corrected in the trauma bay. After CT, I called repaired his bleeding scalp laceration in the ICU.  His ABG showed a significant respiratory acidosis, which is likely a result of right mainstem intubation and we are actively correcting at this time.     Plan: Admit to ICU.       Subarachnoid hemorrhage following injury (HCC)- (present on admission)  Assessment & Plan  Subtle subarachnoid hemorrhage or minimal contusion in left vertex.  Non-operative management.  Post traumatic pharmacologic seizure prophylaxis for 1 week.  Speech Language Pathology cognitive evaluation.  Porfirio Lea MD. Neurosurgeon. [facility].    Respiratory failure following trauma (HCC)- (present on admission)  Assessment & Plan  Intubated at the scene for low GCS.  Continue full mechanical ventilatory support. Ventilator bundle and Trauma weaning protocol.    Contraindication to deep vein thrombosis (DVT) prophylaxis- (present on admission)  Assessment & Plan  Prophylactic anticoagulation for thrombotic prevention initially contraindicated secondary to elevated bleeding risk.  3/1 Trauma surveillance venous duplex scanning ordered.    Screening examination for infectious disease- (present on admission)  Assessment & Plan  2/28 COVID-19 specimen sent. AIRBORNE & CONTACT/EYE ISOLATION implemented pending final SARS-CoV-2 testing.    Trauma- (present on admission)  Assessment & Plan  Ground level fall.  Trauma Red Activation.  Michael Clark MD. Trauma Surgery.      Of note, my timely arrival to the trauma bay for this patient was slowed by mandatory Covid-19-related security checkpoint delays.    The patient is/remains critically ill with traumatic brain injury, respiratory failure.    I provided the following critical care services: establishment and management of mechanical ventilation, high risk medication management, bedside communication with consulting physicians.    Critical care time  spent exclusive of procedures: 77 minutes.            Michael Clark MD  [phone]

## 2021-03-01 NOTE — PROGRESS NOTES
Patient arrived to S118 with ED team     ST  BP: 188/102  RR 28  O2: 94 on 100% FiO2  101.6Kg  97.3F    2 RN skin assessment completed with RN, Juancho  Posterior head laceration/hematoma  Sacral region is intact and blanching   Bilateral heels are red but blanching and intact     2137 Dr. Clark at bedside for closure of posterior head laceration. 2 sutures and 4 staples placed.

## 2021-03-02 ENCOUNTER — HOSPITAL ENCOUNTER (OUTPATIENT)
Dept: RADIOLOGY | Facility: MEDICAL CENTER | Age: 41
End: 2021-03-02
Attending: SURGERY
Payer: MEDICAID

## 2021-03-02 PROBLEM — R31.9 HEMATURIA: Status: ACTIVE | Noted: 2021-03-02

## 2021-03-02 PROBLEM — J98.11 BILATERAL ATELECTASIS: Status: RESOLVED | Noted: 2021-03-01 | Resolved: 2021-03-02

## 2021-03-02 PROBLEM — Z78.9 NO CONTRAINDICATION TO DEEP VEIN THROMBOSIS (DVT) PROPHYLAXIS: Status: ACTIVE | Noted: 2021-02-28

## 2021-03-02 LAB
ANION GAP SERPL CALC-SCNC: 11 MMOL/L (ref 7–16)
BASOPHILS # BLD AUTO: 0.3 % (ref 0–1.8)
BASOPHILS # BLD: 0.03 K/UL (ref 0–0.12)
BUN SERPL-MCNC: 16 MG/DL (ref 8–22)
CALCIUM SERPL-MCNC: 8 MG/DL (ref 8.5–10.5)
CHLORIDE SERPL-SCNC: 102 MMOL/L (ref 96–112)
CO2 SERPL-SCNC: 24 MMOL/L (ref 20–33)
CREAT SERPL-MCNC: 0.74 MG/DL (ref 0.5–1.4)
EOSINOPHIL # BLD AUTO: 0.09 K/UL (ref 0–0.51)
EOSINOPHIL NFR BLD: 1 % (ref 0–6.9)
ERYTHROCYTE [DISTWIDTH] IN BLOOD BY AUTOMATED COUNT: 41.9 FL (ref 35.9–50)
EST. AVERAGE GLUCOSE BLD GHB EST-MCNC: 192 MG/DL
GLUCOSE BLD-MCNC: 139 MG/DL (ref 65–99)
GLUCOSE BLD-MCNC: 174 MG/DL (ref 65–99)
GLUCOSE BLD-MCNC: 183 MG/DL (ref 65–99)
GLUCOSE BLD-MCNC: 199 MG/DL (ref 65–99)
GLUCOSE BLD-MCNC: 207 MG/DL (ref 65–99)
GLUCOSE SERPL-MCNC: 156 MG/DL (ref 65–99)
HBA1C MFR BLD: 8.3 % (ref 4–5.6)
HCT VFR BLD AUTO: 39.1 % (ref 42–52)
HGB BLD-MCNC: 13.5 G/DL (ref 14–18)
IMM GRANULOCYTES # BLD AUTO: 0.05 K/UL (ref 0–0.11)
IMM GRANULOCYTES NFR BLD AUTO: 0.5 % (ref 0–0.9)
LYMPHOCYTES # BLD AUTO: 1.29 K/UL (ref 1–4.8)
LYMPHOCYTES NFR BLD: 13.7 % (ref 22–41)
MCH RBC QN AUTO: 32.8 PG (ref 27–33)
MCHC RBC AUTO-ENTMCNC: 34.5 G/DL (ref 33.7–35.3)
MCV RBC AUTO: 95.1 FL (ref 81.4–97.8)
MONOCYTES # BLD AUTO: 0.72 K/UL (ref 0–0.85)
MONOCYTES NFR BLD AUTO: 7.6 % (ref 0–13.4)
NEUTROPHILS # BLD AUTO: 7.25 K/UL (ref 1.82–7.42)
NEUTROPHILS NFR BLD: 76.9 % (ref 44–72)
NRBC # BLD AUTO: 0 K/UL
NRBC BLD-RTO: 0 /100 WBC
PLATELET # BLD AUTO: 166 K/UL (ref 164–446)
PMV BLD AUTO: 10.5 FL (ref 9–12.9)
POTASSIUM SERPL-SCNC: 3.6 MMOL/L (ref 3.6–5.5)
RBC # BLD AUTO: 4.11 M/UL (ref 4.7–6.1)
SODIUM SERPL-SCNC: 137 MMOL/L (ref 135–145)
WBC # BLD AUTO: 9.4 K/UL (ref 4.8–10.8)

## 2021-03-02 PROCEDURE — 85025 COMPLETE CBC W/AUTO DIFF WBC: CPT

## 2021-03-02 PROCEDURE — A9270 NON-COVERED ITEM OR SERVICE: HCPCS | Performed by: SURGERY

## 2021-03-02 PROCEDURE — 700102 HCHG RX REV CODE 250 W/ 637 OVERRIDE(OP): Performed by: SURGERY

## 2021-03-02 PROCEDURE — 80048 BASIC METABOLIC PNL TOTAL CA: CPT

## 2021-03-02 PROCEDURE — 700101 HCHG RX REV CODE 250: Performed by: SURGERY

## 2021-03-02 PROCEDURE — 700111 HCHG RX REV CODE 636 W/ 250 OVERRIDE (IP): Performed by: NURSE PRACTITIONER

## 2021-03-02 PROCEDURE — 82962 GLUCOSE BLOOD TEST: CPT | Mod: 91

## 2021-03-02 PROCEDURE — 3E02340 INTRODUCTION OF INFLUENZA VACCINE INTO MUSCLE, PERCUTANEOUS APPROACH: ICD-10-PCS | Performed by: SURGERY

## 2021-03-02 PROCEDURE — 700101 HCHG RX REV CODE 250: Performed by: NURSE PRACTITIONER

## 2021-03-02 PROCEDURE — 83036 HEMOGLOBIN GLYCOSYLATED A1C: CPT

## 2021-03-02 PROCEDURE — 71045 X-RAY EXAM CHEST 1 VIEW: CPT

## 2021-03-02 PROCEDURE — 90686 IIV4 VACC NO PRSV 0.5 ML IM: CPT | Performed by: SURGERY

## 2021-03-02 PROCEDURE — 770006 HCHG ROOM/CARE - MED/SURG/GYN SEMI*

## 2021-03-02 PROCEDURE — 700111 HCHG RX REV CODE 636 W/ 250 OVERRIDE (IP): Performed by: SURGERY

## 2021-03-02 PROCEDURE — 90471 IMMUNIZATION ADMIN: CPT

## 2021-03-02 PROCEDURE — 92523 SPEECH SOUND LANG COMPREHEN: CPT

## 2021-03-02 RX ORDER — DEXTROSE MONOHYDRATE 25 G/50ML
50 INJECTION, SOLUTION INTRAVENOUS
Status: DISCONTINUED | OUTPATIENT
Start: 2021-03-02 | End: 2021-03-03 | Stop reason: HOSPADM

## 2021-03-02 RX ORDER — LORAZEPAM 1 MG/1
0.5 TABLET ORAL EVERY 4 HOURS PRN
Status: DISCONTINUED | OUTPATIENT
Start: 2021-03-02 | End: 2021-03-03

## 2021-03-02 RX ORDER — LABETALOL HYDROCHLORIDE 5 MG/ML
10 INJECTION, SOLUTION INTRAVENOUS EVERY 4 HOURS PRN
Status: DISCONTINUED | OUTPATIENT
Start: 2021-03-02 | End: 2021-03-03

## 2021-03-02 RX ORDER — BUTALBITAL, ACETAMINOPHEN AND CAFFEINE 50; 325; 40 MG/1; MG/1; MG/1
1 TABLET ORAL EVERY 6 HOURS PRN
Status: DISCONTINUED | OUTPATIENT
Start: 2021-03-02 | End: 2021-03-03 | Stop reason: HOSPADM

## 2021-03-02 RX ORDER — ACETAMINOPHEN 325 MG/1
650 TABLET ORAL EVERY 4 HOURS PRN
Status: DISCONTINUED | OUTPATIENT
Start: 2021-03-02 | End: 2021-03-03 | Stop reason: HOSPADM

## 2021-03-02 RX ORDER — ENALAPRILAT 1.25 MG/ML
1.25 INJECTION INTRAVENOUS EVERY 6 HOURS PRN
Status: DISCONTINUED | OUTPATIENT
Start: 2021-03-02 | End: 2021-03-03 | Stop reason: HOSPADM

## 2021-03-02 RX ADMIN — OXYCODONE 5 MG: 5 TABLET ORAL at 04:06

## 2021-03-02 RX ADMIN — BUTALBITAL, ACETAMINOPHEN, AND CAFFEINE 1 TABLET: 50; 325; 40 TABLET, COATED ORAL at 20:31

## 2021-03-02 RX ADMIN — OXYCODONE 5 MG: 5 TABLET ORAL at 18:04

## 2021-03-02 RX ADMIN — ENALAPRILAT 1.25 MG: 1.25 INJECTION INTRAVENOUS at 18:42

## 2021-03-02 RX ADMIN — CHLORDIAZEPOXIDE HYDROCHLORIDE 25 MG: 25 CAPSULE ORAL at 23:33

## 2021-03-02 RX ADMIN — OXYCODONE 5 MG: 5 TABLET ORAL at 14:25

## 2021-03-02 RX ADMIN — LABETALOL HYDROCHLORIDE 10 MG: 5 INJECTION, SOLUTION INTRAVENOUS at 15:54

## 2021-03-02 RX ADMIN — OXYCODONE 5 MG: 5 TABLET ORAL at 23:32

## 2021-03-02 RX ADMIN — FAMOTIDINE 20 MG: 20 TABLET ORAL at 05:41

## 2021-03-02 RX ADMIN — CHLORDIAZEPOXIDE HYDROCHLORIDE 50 MG: 25 CAPSULE ORAL at 05:40

## 2021-03-02 RX ADMIN — OXYCODONE HYDROCHLORIDE 10 MG: 10 TABLET ORAL at 08:18

## 2021-03-02 RX ADMIN — DOCUSATE SODIUM 100 MG: 100 CAPSULE, LIQUID FILLED ORAL at 16:46

## 2021-03-02 RX ADMIN — DOCUSATE SODIUM 100 MG: 100 CAPSULE, LIQUID FILLED ORAL at 05:40

## 2021-03-02 RX ADMIN — INSULIN HUMAN 2 UNITS: 100 INJECTION, SOLUTION PARENTERAL at 05:46

## 2021-03-02 RX ADMIN — TAMSULOSIN HYDROCHLORIDE 0.4 MG: 0.4 CAPSULE ORAL at 08:18

## 2021-03-02 RX ADMIN — CHLORDIAZEPOXIDE HYDROCHLORIDE 25 MG: 25 CAPSULE ORAL at 12:23

## 2021-03-02 RX ADMIN — ENOXAPARIN SODIUM 30 MG: 30 INJECTION SUBCUTANEOUS at 17:58

## 2021-03-02 RX ADMIN — INFLUENZA A VIRUS A/GUANGDONG-MAONAN/SWL1536/2019 CNIC-1909 (H1N1) ANTIGEN (FORMALDEHYDE INACTIVATED), INFLUENZA A VIRUS A/HONG KONG/2671/2019 (H3N2) ANTIGEN (FORMALDEHYDE INACTIVATED), INFLUENZA B VIRUS B/PHUKET/3073/2013 ANTIGEN (FORMALDEHYDE INACTIVATED), AND INFLUENZA B VIRUS B/WASHINGTON/02/2019 ANTIGEN (FORMALDEHYDE INACTIVATED) 0.5 ML: 15; 15; 15; 15 INJECTION, SUSPENSION INTRAMUSCULAR at 15:54

## 2021-03-02 RX ADMIN — POLYETHYLENE GLYCOL 3350 1 PACKET: 17 POWDER, FOR SOLUTION ORAL at 16:46

## 2021-03-02 RX ADMIN — CHLORDIAZEPOXIDE HYDROCHLORIDE 25 MG: 25 CAPSULE ORAL at 16:46

## 2021-03-02 ASSESSMENT — ENCOUNTER SYMPTOMS
NAUSEA: 0
FOCAL WEAKNESS: 0
SORE THROAT: 1
BLURRED VISION: 0
NECK PAIN: 0
HEADACHES: 0
DOUBLE VISION: 0
MYALGIAS: 1
FEVER: 0
ABDOMINAL PAIN: 0
VOMITING: 0
SHORTNESS OF BREATH: 0
SPEECH CHANGE: 0
BACK PAIN: 0
CHILLS: 0

## 2021-03-02 ASSESSMENT — PAIN DESCRIPTION - PAIN TYPE
TYPE: ACUTE PAIN

## 2021-03-02 ASSESSMENT — LIFESTYLE VARIABLES
ON A TYPICAL DAY WHEN YOU DRINK ALCOHOL HOW MANY DRINKS DO YOU HAVE: 18
HAVE PEOPLE ANNOYED YOU BY CRITICIZING YOUR DRINKING: NO
ALCOHOL_USE: YES
TOTAL SCORE: 1
AVERAGE NUMBER OF DAYS PER WEEK YOU HAVE A DRINK CONTAINING ALCOHOL: 36
HOW MANY TIMES IN THE PAST YEAR HAVE YOU HAD 5 OR MORE DRINKS IN A DAY: 52
CONSUMPTION TOTAL: POSITIVE
DOES PATIENT WANT TO STOP DRINKING: NO
HAVE YOU EVER FELT YOU SHOULD CUT DOWN ON YOUR DRINKING: NO
TOTAL SCORE: 1
TOTAL SCORE: 1
EVER HAD A DRINK FIRST THING IN THE MORNING TO STEADY YOUR NERVES TO GET RID OF A HANGOVER: YES
EVER FELT BAD OR GUILTY ABOUT YOUR DRINKING: NO

## 2021-03-02 ASSESSMENT — FIBROSIS 4 INDEX: FIB4 SCORE: 1.27

## 2021-03-02 NOTE — PROGRESS NOTES
"Neurosurgery Progress Note    Subjective:  Pt sitting in chair at bedside. C/O h/a, meds help. No n/v, feels \"fuzzy\"    Exam:  AAOX3  FC's, lima's strong  No drift  Face symmetrical, speech clear    BP  Min: 136/77  Max: 184/104  Pulse  Av.8  Min: 90  Max: 110  Resp  Av.6  Min: 13  Max: 29  Monitored Temp 2  Av.6 °C (99.6 °F)  Min: 36.9 °C (98.4 °F)  Max: 38 °C (100.4 °F)  SpO2  Av.5 %  Min: 87 %  Max: 99 %    No data recorded    Recent Labs     21  0040 21  0501 21  0530   WBC 23.6* 16.8* 9.4   RBC 5.08 4.54* 4.11*   HEMOGLOBIN 16.8 14.7 13.5*   HEMATOCRIT 48.6 43.6 39.1*   MCV 95.7 96.0 95.1   MCH 33.1* 32.4 32.8   MCHC 34.6 33.7 34.5   RDW 42.5 42.5 41.9   PLATELETCT 324 257 166   MPV 10.3 10.5 10.5     Recent Labs     21  0501 21  0530   SODIUM 132* 138 137   POTASSIUM 5.1 4.3 3.6   CHLORIDE 99 105 102   CO2 14* 16* 24   GLUCOSE 349* 262* 156*   BUN 11 11 16   CREATININE 0.85 1.11 0.74   CALCIUM 8.8 7.6* 8.0*     Recent Labs     21   APTT 27.6   INR 0.98     Recent Labs     21   REACTMIN 7.5   CLOTKINET 3.5*   CLOTANGL 49.6*   MAXCLOTS 61.6   UGV87KUM 0.0   PRCINADP 52.2   PRCINAA 18.8       Intake/Output       21 - 2159 21 - 21 0659      [id number] 6795-0066 Total [id number] Total       Intake    P.O.  --  100 100  --  -- --    P.O. -- 100 100 -- -- --    I.V.  1390.1  1200 2590.1  --  -- --    Propofol Volume 190.1 -- 190.1 -- -- --    Volume (mL) (NS infusion) 1200 1200 2400 -- -- --    Total Intake 1390.1 1300 2690.1 -- -- --       Output    Urine  850  650 1500  --  -- --    Output (mL) (Urethral Catheter Temperature probe 18 Fr.)  -- -- --    Stool  --  -- --  --  -- --    Number of Times Stooled 1 x -- 1 x -- -- --    Total Output  -- -- --       Net I/O     540.1 650 1190.1 -- -- --            Intake/Output Summary (Last 24 hours) at 3/2/2021 " 0911  Last data filed at 3/2/2021 0600  Gross per 24 hour   Intake 2375.6 ml   Output 1250 ml   Net 1125.6 ml            • insulin regular  2-9 Units 4X/DAY ACHS    And   • glucose  16 g Q15 MIN PRN    And   • dextrose 50%  50 mL Q15 MIN PRN   • LORazepam  4 mg Q15 MIN PRN    Or   • LORazepam  3 mg Q15 MIN PRN    Or   • LORazepam  2 mg Q15 MIN PRN    Or   • LORazepam  1 mg Q15 MIN PRN   • chlordiazePOXIDE  25 mg Q6HRS   • tamsulosin  0.4 mg AFTER BREAKFAST   • Respiratory Therapy Consult   Continuous RT   • Pharmacy Consult Request  1 Each PHARMACY TO DOSE   • docusate sodium  100 mg BID   • senna-docusate  1 tablet Nightly   • senna-docusate  1 tablet Q24HRS PRN   • polyethylene glycol/lytes  1 Packet BID   • magnesium hydroxide  30 mL DAILY   • bisacodyl  10 mg Q24HRS PRN   • fleet  1 Each Once PRN   • NS   Continuous   • oxyCODONE immediate-release  5 mg Q3HRS PRN    Or   • oxyCODONE immediate-release  10 mg Q3HRS PRN    Or   • morphine injection  4 mg Q HOUR PRN   • famotidine  20 mg BID    Or   • famotidine  20 mg BID   • ondansetron  4 mg Q4HRS PRN   • labetalol  10 mg Q4HRS PRN       Assessment and Plan:  Hospital day #3 CHI with minimal TSAH  POD # 0  NM stable - No plans from our standpoint - call if questions/ need  Ok to floor - increase activity  DC home when stable  Keppra X1 wk  No need for follow up with us as outpatient - f/u PCP  Prophylactic anticoagulation: yes         Start date/time: per Trauma, if necessary

## 2021-03-02 NOTE — PROGRESS NOTES
2 RN skin Assessment;     Left posterior head laceration, red, staples, scant serosanguinous drainage. Right upper buttocks scab C/DI open to air.

## 2021-03-02 NOTE — CARE PLAN
Problem: Pain Management  Goal: Pain level will decrease to patient's comfort goal  3/2/2021 0229 by Sharon Osorio R.N.  Outcome: PROGRESSING AS EXPECTED  Pt has posterior left head laceration.  Pt given Oxycodone 5 mg for pain.  Pt had traumatic ovalles insertion, Pt given Oxycodone 5 mg for penile pain.   Continue to monitor.       Problem: Respiratory:  Goal: Respiratory status will improve  3/2/2021 0229 by Sharon Osorio R.N.  Outcome: PROGRESSING AS EXPECTED  Pt extubated on 03/01/21.  Pt on room air.  Pt educated on importance of coughing and deep breathing.  Pt desaturated to 86% on room air while sleeping.  Pt placed on 2 liters nasal cannula.  Pt given IS and educated on how to use.  Continue to encourage Pt to use IS while awake.

## 2021-03-02 NOTE — CARE PLAN
"Pt was extubated this AM around 11am; Now on RA and tolerating well. ETOH withdrawal protocols in place for PRN ativan; one dose this shift.   Pt ambulated, tolerated well, standby assist.  Pt A&Ox3 to self, place, and time. Pt does not remember situation.  Pt c/o penile pain; had a traumatic ovalles placement per Noc shift RN, wife states he \"has trouble peeing at home,\" Flomax started this shift; see MAR. PRN oxycodone for pain.   Pt denies nausea and has adequate water intake, but inadequate food intake at this time.  Pt needs more education on the importance of quitting EtOH and attending AA.  "

## 2021-03-02 NOTE — ASSESSMENT & PLAN NOTE
Hematuria post ovalles insertion.  3/1 CT abdomen/pelvis with normal ureters and unremarkable kidneys.  3/2 Continue ovalles catheter  3/3 No hematuria, dc ovalles

## 2021-03-02 NOTE — PROGRESS NOTES
Trauma / Surgical Daily Progress Note    Date of Service  3/1/2021    Chief Complaint  40 y.o. male admitted 2/28/2021 with     Interval Events  CRITICAL CARE DECISION MAKING:    Patient examined and discussed with team at bedside.  Pending extubation and post extubation care require patient remain in the intensive care unit    Subarachnoid hemorrhage: No deficits and alert and oriented after extubation.  Minimal hemorrhage.  No need for Keppra follow-up imaging.    Possible seizure activity at time of injury: Ativan given but no events since incident.  Monitor    Addressed pulmonary hygiene concerns as well as oxygenation/ventilation: Patient extubated during rounds with team at bedside.  Initial increased work of breathing mitigated by aggressive pulmonary hygiene and patient is now on 2 L nasal cannula.    Alcohol intoxication with reported history of heavy use: Alcohol withdrawal protocol ordered/RASS scale.  No indications yet but continue monitoring for signs of withdrawal    Labs reviewed, electrolytes addressed, renal function assessed  Reviewed nutrition strategies, recent indices: Regular diet ordered  Addressed GI prophylaxis and bowel frequency: Meds  Assessed/discussed/titrated analgesics and need for sedatives  Addressed DVT prophylaxis: Hold off on Lovenox  Addressed line days, ovalles catheter days, access needs  Addressed family and discharge concerns:       Review of Systems  Review of Systems   Respiratory: Negative.    Gastrointestinal: Negative.    Neurological: Positive for dizziness and headaches. Negative for tremors, weakness and numbness.        Vital Signs for last 24 hours  Temp:  [36.2 °C (97.2 °F)-36.6 °C (97.8 °F)] 36.6 °C (97.8 °F)  Pulse:  [] 90  Resp:  [13-26] 14  BP: ()/() 154/94  SpO2:  [82 %-100 %] 94 %    Hemodynamic parameters for last 24 hours    /94   Pulse 90   Temp 36.6 °C (97.8 °F) (Temporal)   Resp 14   Ht 1.829 m (6')   Wt 102 kg (223 lb 15.8  oz)   SpO2 94%   BMI 30.38 kg/m²       Respiratory Data     Respiration: 14, Pulse Oximetry: 94 %     Work Of Breathing / Effort: Vented  RUL Breath Sounds: Clear, RML Breath Sounds: Clear, RLL Breath Sounds: Diminished, BAUTISTA Breath Sounds: Diminished, LLL Breath Sounds: Diminished    Physical Exam  Physical Exam  Vitals and nursing note reviewed.   Constitutional:       Interventions: Nasal cannula in place.   HENT:      Head:      Comments: Large staple laceration on the back of the head     Nose: Nose normal.      Mouth/Throat:      Mouth: Mucous membranes are moist.      Pharynx: Oropharynx is clear.   Eyes:      Extraocular Movements: Extraocular movements intact.      Conjunctiva/sclera: Conjunctivae normal.      Pupils: Pupils are equal, round, and reactive to light.   Cardiovascular:      Rate and Rhythm: Normal rate and regular rhythm.      Pulses: Normal pulses.   Pulmonary:      Effort: No respiratory distress.      Breath sounds: No stridor. No wheezing or rales.   Chest:      Chest wall: No tenderness.   Abdominal:      General: There is no distension.      Palpations: Abdomen is soft.      Tenderness: There is no abdominal tenderness.   Musculoskeletal:         General: No swelling, deformity or signs of injury.      Cervical back: Normal range of motion and neck supple. No tenderness.   Skin:     General: Skin is warm and dry.      Coloration: Skin is not pale.   Neurological:      General: No focal deficit present.      Mental Status: He is oriented to person, place, and time and easily aroused.   Psychiatric:         Behavior: Behavior is cooperative.         Laboratory  Recent Results (from the past 24 hour(s))   DIAGNOSTIC ALCOHOL    Collection Time: 02/28/21  8:54 PM   Result Value Ref Range    Diagnostic Alcohol 325.9 (H) 0.0 - 10.0 mg/dL   CBC WITHOUT DIFFERENTIAL    Collection Time: 02/28/21  8:54 PM   Result Value Ref Range    WBC 7.7 4.8 - 10.8 K/uL    RBC 5.77 4.70 - 6.10 M/uL     Hemoglobin 18.7 (H) 14.0 - 18.0 g/dL    Hematocrit 54.6 (H) 42.0 - 52.0 %    MCV 94.6 81.4 - 97.8 fL    MCH 32.4 27.0 - 33.0 pg    MCHC 34.2 33.7 - 35.3 g/dL    RDW 41.1 35.9 - 50.0 fL    Platelet Count 278 164 - 446 K/uL    MPV 10.6 9.0 - 12.9 fL   Comp Metabolic Panel    Collection Time: 02/28/21  8:54 PM   Result Value Ref Range    Sodium 132 (L) 135 - 145 mmol/L    Potassium 5.1 3.6 - 5.5 mmol/L    Chloride 99 96 - 112 mmol/L    Co2 14 (L) 20 - 33 mmol/L    Anion Gap 19.0 (H) 7.0 - 16.0    Glucose 349 (H) 65 - 99 mg/dL    Bun 11 8 - 22 mg/dL    Creatinine 0.85 0.50 - 1.40 mg/dL    Calcium 8.8 8.5 - 10.5 mg/dL    AST(SGOT) 30 12 - 45 U/L    ALT(SGPT) 43 2 - 50 U/L    Alkaline Phosphatase 142 (H) 30 - 99 U/L    Total Bilirubin 0.2 0.1 - 1.5 mg/dL    Albumin 4.7 3.2 - 4.9 g/dL    Total Protein 8.9 (H) 6.0 - 8.2 g/dL    Globulin 4.2 (H) 1.9 - 3.5 g/dL    A-G Ratio 1.1 g/dL   Prothrombin Time    Collection Time: 02/28/21  8:54 PM   Result Value Ref Range    PT 13.3 12.0 - 14.6 sec    INR 0.98 0.87 - 1.13   APTT    Collection Time: 02/28/21  8:54 PM   Result Value Ref Range    APTT 27.6 24.7 - 36.0 sec   LACTIC ACID    Collection Time: 02/28/21  8:54 PM   Result Value Ref Range    Lactic Acid 2.9 (H) 0.5 - 2.0 mmol/L   COD - Adult (Type and Screen)    Collection Time: 02/28/21  8:54 PM   Result Value Ref Range    ABO Grouping Only O     Rh Grouping Only NEG     Antibody Screen-Cod NEG    ESTIMATED GFR    Collection Time: 02/28/21  8:54 PM   Result Value Ref Range    GFR If African American >60 >60 mL/min/1.73 m 2    GFR If Non African American >60 >60 mL/min/1.73 m 2   PLATELET MAPPING WITH BASIC TEG    Collection Time: 02/28/21  8:59 PM   Result Value Ref Range    Reaction Time Initial-R 7.5 5.0 - 10.0 min    Clot Kinetics-K 3.5 (H) 1.0 - 3.0 min    Clot Angle-Angle 49.6 (L) 53.0 - 72.0 degrees    Maximum Clot Strength-MA 61.6 50.0 - 70.0 mm    Lysis 30 minutes-LY30 0.0 0.0 - 8.0 %    % Inhibition ADP 52.2 %    %  Inhibition AA 18.8 %    TEG Algorithm Link Algorithm    ABO Rh Confirm    Collection Time: 02/28/21  8:59 PM   Result Value Ref Range    ABO Rh Confirm O NEG    SARS-COV Antigen LINDSEY: Collect dry nasal swab AND NP swab in VTM    Collection Time: 02/28/21  8:59 PM   Result Value Ref Range    SARS-CoV-2 Source Nasal Swab     SARS-COV ANTIGEN LINDSEY NotDetected Not-Detected   SARS-CoV-2 PCR (24 hour In-House): Collect NP swab in VTM    Collection Time: 02/28/21  8:59 PM    Specimen: Nasopharyngeal; Respirate   Result Value Ref Range    SARS-CoV-2 Source NP Swab     SARS-CoV-2 by PCR NotDetected    ISTAT ARTERIAL BLOOD GAS    Collection Time: 02/28/21  9:33 PM   Result Value Ref Range    Ph 7.053 (LL) 7.400 - 7.500    Pco2 81.3 (HH) 26.0 - 37.0 mmHg    Po2 107 (H) 64 - 87 mmHg    Tco2 25 20 - 33 mmol/L    S02 95 93 - 99 %    Hco3 22.7 17.0 - 25.0 mmol/L    BE -10 (L) -4 - 3 mmol/L    Body Temp see below degrees    O2 Therapy 100 %    iPF Ratio 107     Specimen Arterial     Action Range Triggered YES     Inst. Qualified Patient YES    ISTAT ARTERIAL BLOOD GAS    Collection Time: 02/28/21 11:29 PM   Result Value Ref Range    Ph 7.212 (LL) 7.400 - 7.500    Pco2 42.2 (H) 26.0 - 37.0 mmHg    Po2 243 (H) 64 - 87 mmHg    Tco2 18 (L) 20 - 33 mmol/L    S02 100 (H) 93 - 99 %    Hco3 17.0 17.0 - 25.0 mmol/L    BE -11 (L) -4 - 3 mmol/L    Body Temp 97.4 F degrees    O2 Therapy 100 %    iPF Ratio 243     Ph Temp Gurwinder 7.221 (LL) 7.400 - 7.500    Pco2 Temp Co 41.0 (H) 26.0 - 37.0 mmHg    Po2 Temp Cor 239 (H) 64 - 87 mmHg    Specimen Arterial     Action Range Triggered YES     Inst. Qualified Patient YES    CBC WITH DIFFERENTIAL    Collection Time: 03/01/21 12:40 AM   Result Value Ref Range    WBC 23.6 (H) 4.8 - 10.8 K/uL    RBC 5.08 4.70 - 6.10 M/uL    Hemoglobin 16.8 14.0 - 18.0 g/dL    Hematocrit 48.6 42.0 - 52.0 %    MCV 95.7 81.4 - 97.8 fL    MCH 33.1 (H) 27.0 - 33.0 pg    MCHC 34.6 33.7 - 35.3 g/dL    RDW 42.5 35.9 - 50.0 fL     Platelet Count 324 164 - 446 K/uL    MPV 10.3 9.0 - 12.9 fL    Neutrophils-Polys 79.40 (H) 44.00 - 72.00 %    Lymphocytes 11.90 (L) 22.00 - 41.00 %    Monocytes 6.70 0.00 - 13.40 %    Eosinophils 0.00 0.00 - 6.90 %    Basophils 0.50 0.00 - 1.80 %    Immature Granulocytes 1.50 (H) 0.00 - 0.90 %    Nucleated RBC 0.00 /100 WBC    Neutrophils (Absolute) 18.76 (H) 1.82 - 7.42 K/uL    Lymphs (Absolute) 2.81 1.00 - 4.80 K/uL    Monos (Absolute) 1.59 (H) 0.00 - 0.85 K/uL    Eos (Absolute) 0.01 0.00 - 0.51 K/uL    Baso (Absolute) 0.11 0.00 - 0.12 K/uL    Immature Granulocytes (abs) 0.35 (H) 0.00 - 0.11 K/uL    NRBC (Absolute) 0.00 K/uL   ACCU-CHEK GLUCOSE    Collection Time: 03/01/21 12:46 AM   Result Value Ref Range    Glucose - Accu-Ck 333 (H) 65 - 99 mg/dL   ISTAT ARTERIAL BLOOD GAS    Collection Time: 03/01/21  3:36 AM   Result Value Ref Range    Ph 7.283 (LL) 7.400 - 7.500    Pco2 34.9 26.0 - 37.0 mmHg    Po2 182 (H) 64 - 87 mmHg    Tco2 18 (L) 20 - 33 mmol/L    S02 99 93 - 99 %    Hco3 16.5 (L) 17.0 - 25.0 mmol/L    BE -9 (L) -4 - 3 mmol/L    Body Temp 98.2 F degrees    O2 Therapy 60 %    iPF Ratio 303     Ph Temp Gurwinder 7.286 (LL) 7.400 - 7.500    Pco2 Temp Co 34.6 26.0 - 37.0 mmHg    Po2 Temp Cor 181 (H) 64 - 87 mmHg    Specimen Arterial     Action Range Triggered YES     Inst. Qualified Patient YES    CBC with Differential: Tomorrow AM    Collection Time: 03/01/21  5:01 AM   Result Value Ref Range    WBC 16.8 (H) 4.8 - 10.8 K/uL    RBC 4.54 (L) 4.70 - 6.10 M/uL    Hemoglobin 14.7 14.0 - 18.0 g/dL    Hematocrit 43.6 42.0 - 52.0 %    MCV 96.0 81.4 - 97.8 fL    MCH 32.4 27.0 - 33.0 pg    MCHC 33.7 33.7 - 35.3 g/dL    RDW 42.5 35.9 - 50.0 fL    Platelet Count 257 164 - 446 K/uL    MPV 10.5 9.0 - 12.9 fL    Neutrophils-Polys 82.60 (H) 44.00 - 72.00 %    Lymphocytes 9.80 (L) 22.00 - 41.00 %    Monocytes 6.60 0.00 - 13.40 %    Eosinophils 0.00 0.00 - 6.90 %    Basophils 0.20 0.00 - 1.80 %    Immature Granulocytes 0.80 0.00 -  0.90 %    Nucleated RBC 0.00 /100 WBC    Neutrophils (Absolute) 13.86 (H) 1.82 - 7.42 K/uL    Lymphs (Absolute) 1.64 1.00 - 4.80 K/uL    Monos (Absolute) 1.10 (H) 0.00 - 0.85 K/uL    Eos (Absolute) 0.00 0.00 - 0.51 K/uL    Baso (Absolute) 0.03 0.00 - 0.12 K/uL    Immature Granulocytes (abs) 0.13 (H) 0.00 - 0.11 K/uL    NRBC (Absolute) 0.00 K/uL   Comp Metabolic Panel (CMP): Tomorrow AM    Collection Time: 03/01/21  5:01 AM   Result Value Ref Range    Sodium 138 135 - 145 mmol/L    Potassium 4.3 3.6 - 5.5 mmol/L    Chloride 105 96 - 112 mmol/L    Co2 16 (L) 20 - 33 mmol/L    Anion Gap 17.0 (H) 7.0 - 16.0    Glucose 262 (H) 65 - 99 mg/dL    Bun 11 8 - 22 mg/dL    Creatinine 1.11 0.50 - 1.40 mg/dL    Calcium 7.6 (L) 8.5 - 10.5 mg/dL    AST(SGOT) 31 12 - 45 U/L    ALT(SGPT) 38 2 - 50 U/L    Alkaline Phosphatase 75 30 - 99 U/L    Total Bilirubin 0.2 0.1 - 1.5 mg/dL    Albumin 3.8 3.2 - 4.9 g/dL    Total Protein 6.7 6.0 - 8.2 g/dL    Globulin 2.9 1.9 - 3.5 g/dL    A-G Ratio 1.3 g/dL   MAGNESIUM    Collection Time: 03/01/21  5:01 AM   Result Value Ref Range    Magnesium 2.0 1.5 - 2.5 mg/dL   PHOSPHORUS    Collection Time: 03/01/21  5:01 AM   Result Value Ref Range    Phosphorus 3.9 2.5 - 4.5 mg/dL   ESTIMATED GFR    Collection Time: 03/01/21  5:01 AM   Result Value Ref Range    GFR If African American >60 >60 mL/min/1.73 m 2    GFR If Non African American >60 >60 mL/min/1.73 m 2   ACCU-CHEK GLUCOSE    Collection Time: 03/01/21  5:29 AM   Result Value Ref Range    Glucose - Accu-Ck 256 (H) 65 - 99 mg/dL   ACCU-CHEK GLUCOSE    Collection Time: 03/01/21 12:01 PM   Result Value Ref Range    Glucose - Accu-Ck 221 (H) 65 - 99 mg/dL   ACCU-CHEK GLUCOSE    Collection Time: 03/01/21  5:10 PM   Result Value Ref Range    Glucose - Accu-Ck 206 (H) 65 - 99 mg/dL       Fluids    Intake/Output Summary (Last 24 hours) at 3/1/2021 1825  Last data filed at 3/1/2021 1700  Gross per 24 hour   Intake 3347.95 ml   Output 2925 ml   Net 422.95 ml        Core Measures & Quality Metrics  Labs reviewed, Medications reviewed and Radiology images reviewed  Eldridge catheter: No Eldridge      DVT Prophylaxis: Contraindicated - High bleeding risk    Ulcer prophylaxis: Not indicated    Assessed for rehab: Patient unable to tolerate rehabilitation therapeutic regimen    BHARGAV Score  ETOH Screening    Assessment/Plan  Subarachnoid hemorrhage following injury (HCC)- (present on admission)  Assessment & Plan  Subtle subarachnoid hemorrhage or minimal contusion in left vertex.  Non-operative management.   Follow up CTA of brain with slightly more pronounced SAH  Post traumatic pharmacologic seizure prophylaxis not needed at this point  Speech Language Pathology cognitive evaluation.  Porfirio Lea MD. Neurosurgeon. [facility].    Respiratory failure following trauma (HCC)- (present on admission)  Assessment & Plan  Intubated at the scene for low GCS.  Ventilator bundle and Trauma weaning protocol.  3/1 somewhat agitated but was able to provide parameters off propofol  Extubated    Hyperglycemia- (present on admission)  Assessment & Plan  Admission plasma glucose 349.  Unknown history at this time.  Insulin sliding scale.    Bilateral atelectasis- (present on admission)  Assessment & Plan  Atelectasis in the right upper lobe and left lung base.  Supplemental oxygen to maintain O2 saturations greater than 95%  Aggressive pulmonary hygiene. Serial chest radiographs.    Diabetes (HCC)  Assessment & Plan  Elevated blood glucose; history unknown  Medium sliding scale insulin ordered    Acute alcohol intoxication ([facility])- (present on admission)  Assessment & Plan  Admission blood alcohol level of 325.9.  Trauma alcohol withdrawal protocol initiated.  Alcohol withdrawal surveillance.    Scalp laceration, initial encounter- (present on admission)  Assessment & Plan  Posterior scalp hematoma and laceration at the left vertex.  Sutured in ICU.  Suture removal in 7 days  (3/7)    Contraindication to deep vein thrombosis (DVT) prophylaxis- (present on admission)  Assessment & Plan  Prophylactic anticoagulation for thrombotic prevention initially contraindicated secondary to elevated bleeding risk.  3/1 Trauma surveillance venous duplex scanning ordered.   3/2 Cleared to initiate Lovenox if needed.    Screening examination for infectious disease- (present on admission)  Assessment & Plan  2/28 COVID-19 specimen sent. AIRBORNE & CONTACT/EYE ISOLATION implemented pending final SARS-CoV-2 testing.    Trauma- (present on admission)  Assessment & Plan  Ground level fall.  Trauma Red Activation.  Michael Clark MD. Trauma Surgery.        Discussed patient condition with RN, RT, Pharmacy, Dietary,  and Patient.  CRITICAL CARE TIME EXCLUDING PROCEDURES: 37 minutes

## 2021-03-02 NOTE — CARE PLAN
Problem: Safety  Goal: Will remain free from falls  Intervention: Implement fall precautions  Note: Bed/chair alarm on, discuss calling for help before getting up, pod huddle.      Problem: Pain Management  Goal: Pain level will decrease to patient's comfort goal  Intervention: Educate and implement non-pharmacologic comfort measures. Examples: relaxation, distration, play therapy, activity therapy, massage, etc.  Note: Use pharmacological and nonpharmacological methods to control pain.

## 2021-03-02 NOTE — PROGRESS NOTES
2 RN skin assessment completed with RN, Amy  Posterior head laceration with staples present  Sacral region is intact and blanching, mepilex in place  Bilateral heels are red but blanching and intact     Low air loss mattress, turn q 2 hr, good nutrition

## 2021-03-02 NOTE — PROGRESS NOTES
Trauma / Surgical Daily Progress Note    Date of Service  3/2/2021    Chief Complaint  40 y.o. male admitted 2/28/2021 with subarachnoid hemorrhage.     Interval Events    Tolerating extubation.   GCS 15 with no focal deficits.  Hematuria with small clots.   Elevated blood sugars.     - Speech language pathology for cognitive evaluation.  - Continue ovalles catheter.  - Check glycohemoglobin. Diabetic diet. Diabetic educator/nutritionist consult.    - Clinically stable at this time, transfer to [facility].  - Counseled.     Review of Systems  Review of Systems   Constitutional: Negative for chills and fever.   HENT: Positive for sore throat. Negative for hearing loss and tinnitus.    Eyes: Negative for blurred vision and double vision.   Respiratory: Negative for shortness of breath.    Cardiovascular: Negative for chest pain.   Gastrointestinal: Negative for abdominal pain, nausea and vomiting.   Genitourinary: Positive for hematuria. Negative for dysuria and urgency.   Musculoskeletal: Positive for myalgias. Negative for back pain, joint pain and neck pain.   Neurological: Negative for speech change, focal weakness and headaches.        Vital Signs  Pulse:  [] 96  Resp:  [13-29] 18  BP: (144-189)/() 189/97  SpO2:  [87 %-99 %] 97 %    Physical Exam  Physical Exam  Vitals and nursing note reviewed.   Constitutional:       General: He is awake. He is not in acute distress.     Appearance: Normal appearance. He is not ill-appearing, toxic-appearing or diaphoretic.   HENT:      Head:      Comments: Posterior scalp laceration      Nose: Nose normal.      Mouth/Throat:      Mouth: Mucous membranes are moist.      Pharynx: Oropharynx is clear.   Eyes:      General: No scleral icterus.     Extraocular Movements: Extraocular movements intact.      Conjunctiva/sclera: Conjunctivae normal.   Cardiovascular:      Rate and Rhythm: Normal rate and regular rhythm.      Pulses: Normal pulses.   Pulmonary:      Effort:  Pulmonary effort is normal. No tachypnea, accessory muscle usage or respiratory distress.      Breath sounds: Normal breath sounds. No stridor or decreased air movement. No wheezing or rales.   Chest:      Chest wall: No tenderness.   Abdominal:      General: There is no distension.      Palpations: Abdomen is soft.      Tenderness: There is no abdominal tenderness.   Genitourinary:     Comments: Eldridge, hematuria  Musculoskeletal:         General: No swelling, deformity or signs of injury.      Cervical back: Normal range of motion and neck supple. No tenderness.   Skin:     General: Skin is warm and dry.      Coloration: Skin is not jaundiced or pale.   Neurological:      General: No focal deficit present.      Mental Status: He is alert and oriented to person, place, and time.   Psychiatric:         Mood and Affect: Mood normal.         Behavior: Behavior normal. Behavior is cooperative.         Laboratory  Recent Results (from the past 24 hour(s))   ACCU-CHEK GLUCOSE    Collection Time: 03/01/21  5:10 PM   Result Value Ref Range    Glucose - Accu-Ck 206 (H) 65 - 99 mg/dL   ACCU-CHEK GLUCOSE    Collection Time: 03/01/21 11:49 PM   Result Value Ref Range    Glucose - Accu-Ck 139 (H) 65 - 99 mg/dL   Basic Metabolic Panel    Collection Time: 03/02/21  5:30 AM   Result Value Ref Range    Sodium 137 135 - 145 mmol/L    Potassium 3.6 3.6 - 5.5 mmol/L    Chloride 102 96 - 112 mmol/L    Co2 24 20 - 33 mmol/L    Glucose 156 (H) 65 - 99 mg/dL    Bun 16 8 - 22 mg/dL    Creatinine 0.74 0.50 - 1.40 mg/dL    Calcium 8.0 (L) 8.5 - 10.5 mg/dL    Anion Gap 11.0 7.0 - 16.0   CBC WITH DIFFERENTIAL    Collection Time: 03/02/21  5:30 AM   Result Value Ref Range    WBC 9.4 4.8 - 10.8 K/uL    RBC 4.11 (L) 4.70 - 6.10 M/uL    Hemoglobin 13.5 (L) 14.0 - 18.0 g/dL    Hematocrit 39.1 (L) 42.0 - 52.0 %    MCV 95.1 81.4 - 97.8 fL    MCH 32.8 27.0 - 33.0 pg    MCHC 34.5 33.7 - 35.3 g/dL    RDW 41.9 35.9 - 50.0 fL    Platelet Count 166 164 -  446 K/uL    MPV 10.5 9.0 - 12.9 fL    Neutrophils-Polys 76.90 (H) 44.00 - 72.00 %    Lymphocytes 13.70 (L) 22.00 - 41.00 %    Monocytes 7.60 0.00 - 13.40 %    Eosinophils 1.00 0.00 - 6.90 %    Basophils 0.30 0.00 - 1.80 %    Immature Granulocytes 0.50 0.00 - 0.90 %    Nucleated RBC 0.00 /100 WBC    Neutrophils (Absolute) 7.25 1.82 - 7.42 K/uL    Lymphs (Absolute) 1.29 1.00 - 4.80 K/uL    Monos (Absolute) 0.72 0.00 - 0.85 K/uL    Eos (Absolute) 0.09 0.00 - 0.51 K/uL    Baso (Absolute) 0.03 0.00 - 0.12 K/uL    Immature Granulocytes (abs) 0.05 0.00 - 0.11 K/uL    NRBC (Absolute) 0.00 K/uL   ESTIMATED GFR    Collection Time: 03/02/21  5:30 AM   Result Value Ref Range    GFR If African American >60 >60 mL/min/1.73 m 2    GFR If Non African American >60 >60 mL/min/1.73 m 2   ACCU-CHEK GLUCOSE    Collection Time: 03/02/21  5:39 AM   Result Value Ref Range    Glucose - Accu-Ck 174 (H) 65 - 99 mg/dL       Fluids    Intake/Output Summary (Last 24 hours) at 3/2/2021 1237  Last data filed at 3/2/2021 1000  Gross per 24 hour   Intake 2820 ml   Output 1055 ml   Net 1765 ml       Core Measures & Quality Metrics  Labs reviewed, Medications reviewed and Radiology images reviewed  Eldridge Catheter: Hematuria       DVT Prophylaxis: Enoxaparin (Lovenox)  DVT prophylaxis - mechanical: SCDs  Ulcer prophylaxis: Not indicated    Assessed for rehab: Patient returned to prior level of function, rehabilitation not indicated at this time    RAP Score Total: 7    ETOH Screening     Assessment complete date: 3/2/2021  Intervention: yes. Patient response to intervention: 8 pack of beer of every other day.   Patient demonstrates understanding of intervention. Patient does not agree to follow-up.   has not been contacted. Follow up with: PCP, [facility]  Total ETOH intervention time: 15 - 30 mintues      Assessment/Plan  Subarachnoid hemorrhage following injury (HCC)- (present on admission)  Assessment & Plan  Subtle  subarachnoid hemorrhage or minimal contusion in left vertex.  Non-operative management.   Follow up CTA of brain with slightly more pronounced SAH  3/1 Post traumatic pharmacologic seizure prophylaxis not needed  Speech Language Pathology cognitive evaluation.   No need for follow up with neurosurgery as outpatient.   Porfirio Lea MD. Neurosurgeon. [facility].    Hematuria- (present on admission)  Assessment & Plan  Hematuria post ovalles insertion.  3/1 CT abdomen/pelvis with normal ureters and unremarkable kidneys.  3/2 Continue ovalles catheter    Diabetes (HCC)- (present on admission)  Assessment & Plan  Admission plasma glucose 349.; history unknown.  Insulin sliding scale  3/2 Check glycohemoglobin. Diabetic diet. Diabetic educator/nutritionist consult.     Acute alcohol intoxication (HCC)- (present on admission)  Assessment & Plan  Admission blood alcohol level of 325.9.  Trauma alcohol withdrawal protocol initiated.  Alcohol withdrawal surveillance.     No contraindication to deep vein thrombosis (DVT) prophylaxis- (present on admission)  Assessment & Plan  Prophylactic anticoagulation for thrombotic prevention initially contraindicated secondary to elevated bleeding risk.  3/2 Pharmacological DVT prophylaxis cleared by neurosurgery, initiate.     Scalp laceration, initial encounter- (present on admission)  Assessment & Plan  Posterior scalp hematoma and laceration at the left vertex.  Sutured in ICU.  Suture removal in 7 days (3/7)    Screening examination for infectious disease- (present on admission)  Assessment & Plan  Two SARS-CoV-2 tests negative. Isolation precautions de-escalated.    Respiratory failure following trauma (HCC)- (present on admission)  Assessment & Plan  Intubated at the scene for low GCS.  Ventilator bundle and Trauma weaning protocol.  3/1 Extubate    Trauma- (present on admission)  Assessment & Plan  Ground level fall.  Trauma Red Activation.  Michael Clark MD.  Trauma Surgery.         Discussed patient condition with Patient and trauma surgery, Dr. Nash arenas.

## 2021-03-02 NOTE — PROGRESS NOTES
Trauma / Surgical Daily Progress Note    Date of Service  3/2/2021    Chief Complaint  40 y.o. male admitted 2/28/2021 with     Interval Events  GCS 15  Libium   Headache  Eldridge in , hematuria, traumatic cath.  Flomax  Transfer candidate.    PRN labetalol  See additional note.       Review of Systems  Review of Systems     Vital Signs for last 24 hours  Pulse:  [] 102  Resp:  [13-29] 15  BP: (136-184)/() 181/100  SpO2:  [87 %-100 %] 94 %    Hemodynamic parameters for last 24 hours       Respiratory Data     Respiration: 15, Pulse Oximetry: 94 %     Work Of Breathing / Effort: Within Normal Limits  RUL Breath Sounds: Clear, RML Breath Sounds: Clear, RLL Breath Sounds: Diminished, BAUTISTA Breath Sounds: Clear, LLL Breath Sounds: Diminished    Physical Exam  Physical Exam  Vitals and nursing note reviewed.   Constitutional:       General: He is awake. He is not in acute distress.     Appearance: Normal appearance. He is not ill-appearing, toxic-appearing or diaphoretic.   HENT:      Head:      Comments: Posterior scalp laceration      Nose: Nose normal.      Mouth/Throat:      Mouth: Mucous membranes are moist.      Pharynx: Oropharynx is clear.   Eyes:      General: No scleral icterus.     Extraocular Movements: Extraocular movements intact.      Conjunctiva/sclera: Conjunctivae normal.   Cardiovascular:      Rate and Rhythm: Normal rate and regular rhythm.      Pulses: Normal pulses.   Pulmonary:      Effort: Pulmonary effort is normal. No tachypnea, accessory muscle usage or respiratory distress.      Breath sounds: Normal breath sounds. No stridor or decreased air movement. No wheezing or rales.   Chest:      Chest wall: No tenderness.   Abdominal:      General: There is no distension.      Palpations: Abdomen is soft.      Tenderness: There is no abdominal tenderness.   Genitourinary:     Comments: Eldridge, hematuria  Musculoskeletal:         General: No swelling, deformity or signs of injury.      Cervical  back: Normal range of motion and neck supple. No tenderness.   Skin:     General: Skin is warm and dry.      Coloration: Skin is not jaundiced or pale.   Neurological:      General: No focal deficit present.      Mental Status: He is alert and oriented to person, place, and time.   Psychiatric:         Mood and Affect: Mood normal.         Behavior: Behavior normal. Behavior is cooperative.         Laboratory  Recent Results (from the past 24 hour(s))   ACCU-CHEK GLUCOSE    Collection Time: 03/01/21 12:01 PM   Result Value Ref Range    Glucose - Accu-Ck 221 (H) 65 - 99 mg/dL   ACCU-CHEK GLUCOSE    Collection Time: 03/01/21  5:10 PM   Result Value Ref Range    Glucose - Accu-Ck 206 (H) 65 - 99 mg/dL   ACCU-CHEK GLUCOSE    Collection Time: 03/01/21 11:49 PM   Result Value Ref Range    Glucose - Accu-Ck 139 (H) 65 - 99 mg/dL   Basic Metabolic Panel    Collection Time: 03/02/21  5:30 AM   Result Value Ref Range    Sodium 137 135 - 145 mmol/L    Potassium 3.6 3.6 - 5.5 mmol/L    Chloride 102 96 - 112 mmol/L    Co2 24 20 - 33 mmol/L    Glucose 156 (H) 65 - 99 mg/dL    Bun 16 8 - 22 mg/dL    Creatinine 0.74 0.50 - 1.40 mg/dL    Calcium 8.0 (L) 8.5 - 10.5 mg/dL    Anion Gap 11.0 7.0 - 16.0   CBC WITH DIFFERENTIAL    Collection Time: 03/02/21  5:30 AM   Result Value Ref Range    WBC 9.4 4.8 - 10.8 K/uL    RBC 4.11 (L) 4.70 - 6.10 M/uL    Hemoglobin 13.5 (L) 14.0 - 18.0 g/dL    Hematocrit 39.1 (L) 42.0 - 52.0 %    MCV 95.1 81.4 - 97.8 fL    MCH 32.8 27.0 - 33.0 pg    MCHC 34.5 33.7 - 35.3 g/dL    RDW 41.9 35.9 - 50.0 fL    Platelet Count 166 164 - 446 K/uL    MPV 10.5 9.0 - 12.9 fL    Neutrophils-Polys 76.90 (H) 44.00 - 72.00 %    Lymphocytes 13.70 (L) 22.00 - 41.00 %    Monocytes 7.60 0.00 - 13.40 %    Eosinophils 1.00 0.00 - 6.90 %    Basophils 0.30 0.00 - 1.80 %    Immature Granulocytes 0.50 0.00 - 0.90 %    Nucleated RBC 0.00 /100 WBC    Neutrophils (Absolute) 7.25 1.82 - 7.42 K/uL    Lymphs (Absolute) 1.29 1.00 - 4.80  K/uL    Monos (Absolute) 0.72 0.00 - 0.85 K/uL    Eos (Absolute) 0.09 0.00 - 0.51 K/uL    Baso (Absolute) 0.03 0.00 - 0.12 K/uL    Immature Granulocytes (abs) 0.05 0.00 - 0.11 K/uL    NRBC (Absolute) 0.00 K/uL   ESTIMATED GFR    Collection Time: 03/02/21  5:30 AM   Result Value Ref Range    GFR If African American >60 >60 mL/min/1.73 m 2    GFR If Non African American >60 >60 mL/min/1.73 m 2   ACCU-CHEK GLUCOSE    Collection Time: 03/02/21  5:39 AM   Result Value Ref Range    Glucose - Accu-Ck 174 (H) 65 - 99 mg/dL       Fluids    Intake/Output Summary (Last 24 hours) at 3/2/2021 0858  Last data filed at 3/2/2021 0600  Gross per 24 hour   Intake 2418.08 ml   Output 1250 ml   Net 1168.08 ml       Core Measures & Quality Metrics  Core Measures & Quality Metrics  BHARGAV Score  ETOH Screening    Assessment/Plan  Subarachnoid hemorrhage following injury (HCC)- (present on admission)  Assessment & Plan  Subtle subarachnoid hemorrhage or minimal contusion in left vertex.  Non-operative management.   Follow up CTA of brain with slightly more pronounced SAH  Post traumatic pharmacologic seizure prophylaxis not needed at this point  Speech Language Pathology cognitive evaluation.  Porfirio Lea MD. Neurosurgeon. [facility].    Respiratory failure following trauma (HCC)- (present on admission)  Assessment & Plan  Intubated at the scene for low GCS.  Ventilator bundle and Trauma weaning protocol.  3/1 somewhat agitated but was able to provide parameters off propofol  Extubated    Hyperglycemia- (present on admission)  Assessment & Plan  Admission plasma glucose 349.  Unknown history at this time.  Insulin sliding scale.    Bilateral atelectasis- (present on admission)  Assessment & Plan  Atelectasis in the right upper lobe and left lung base.  Supplemental oxygen to maintain O2 saturations greater than 95%  Aggressive pulmonary hygiene. Serial chest radiographs.    Diabetes (HCC)  Assessment & Plan  Elevated blood  glucose; history unknown  Medium sliding scale insulin ordered    Acute alcohol intoxication (HCC)- (present on admission)  Assessment & Plan  Admission blood alcohol level of 325.9.  Trauma alcohol withdrawal protocol initiated.  Alcohol withdrawal surveillance.    Scalp laceration, initial encounter- (present on admission)  Assessment & Plan  Posterior scalp hematoma and laceration at the left vertex.  Sutured in ICU.  Suture removal in 7 days (3/7)    Contraindication to deep vein thrombosis (DVT) prophylaxis- (present on admission)  Assessment & Plan  Prophylactic anticoagulation for thrombotic prevention initially contraindicated secondary to elevated bleeding risk.  3/1 Trauma surveillance venous duplex scanning ordered.   3/2 Cleared to initiate Lovenox if needed.    Screening examination for infectious disease- (present on admission)  Assessment & Plan  2/28 COVID-19 specimen sent. AIRBORNE & CONTACT/EYE ISOLATION implemented pending final SARS-CoV-2 testing.    Trauma- (present on admission)  Assessment & Plan  Ground level fall.  Trauma Red Activation.  Michael Clark MD. Trauma Surgery.        Discussed patient condition with RN, RT, Pharmacy and Dietary.  CRITICAL CARE TIME EXCLUDING PROCEDURES:  35   Minutes.  See additional note

## 2021-03-03 ENCOUNTER — PHARMACY VISIT (OUTPATIENT)
Dept: PHARMACY | Facility: MEDICAL CENTER | Age: 41
End: 2021-03-03
Payer: COMMERCIAL

## 2021-03-03 VITALS
DIASTOLIC BLOOD PRESSURE: 96 MMHG | TEMPERATURE: 98.9 F | HEART RATE: 92 BPM | SYSTOLIC BLOOD PRESSURE: 140 MMHG | BODY MASS INDEX: 33.06 KG/M2 | WEIGHT: 244.05 LBS | RESPIRATION RATE: 16 BRPM | HEIGHT: 72 IN | OXYGEN SATURATION: 96 %

## 2021-03-03 PROBLEM — J96.90 RESPIRATORY FAILURE FOLLOWING TRAUMA (HCC): Status: RESOLVED | Noted: 2021-02-28 | Resolved: 2021-03-03

## 2021-03-03 PROBLEM — E87.1 HYPONATREMIA: Status: ACTIVE | Noted: 2021-03-03

## 2021-03-03 LAB
ANION GAP SERPL CALC-SCNC: 8 MMOL/L (ref 7–16)
BASOPHILS # BLD AUTO: 0.5 % (ref 0–1.8)
BASOPHILS # BLD: 0.04 K/UL (ref 0–0.12)
BUN SERPL-MCNC: 8 MG/DL (ref 8–22)
CALCIUM SERPL-MCNC: 8.5 MG/DL (ref 8.5–10.5)
CHLORIDE SERPL-SCNC: 99 MMOL/L (ref 96–112)
CO2 SERPL-SCNC: 25 MMOL/L (ref 20–33)
CREAT SERPL-MCNC: 0.76 MG/DL (ref 0.5–1.4)
EOSINOPHIL # BLD AUTO: 0.1 K/UL (ref 0–0.51)
EOSINOPHIL NFR BLD: 1.2 % (ref 0–6.9)
ERYTHROCYTE [DISTWIDTH] IN BLOOD BY AUTOMATED COUNT: 40.6 FL (ref 35.9–50)
GLUCOSE BLD-MCNC: 157 MG/DL (ref 65–99)
GLUCOSE BLD-MCNC: 175 MG/DL (ref 65–99)
GLUCOSE SERPL-MCNC: 144 MG/DL (ref 65–99)
HCT VFR BLD AUTO: 37.3 % (ref 42–52)
HGB BLD-MCNC: 12.9 G/DL (ref 14–18)
IMM GRANULOCYTES # BLD AUTO: 0.04 K/UL (ref 0–0.11)
IMM GRANULOCYTES NFR BLD AUTO: 0.5 % (ref 0–0.9)
LYMPHOCYTES # BLD AUTO: 1.89 K/UL (ref 1–4.8)
LYMPHOCYTES NFR BLD: 22.4 % (ref 22–41)
MCH RBC QN AUTO: 32.5 PG (ref 27–33)
MCHC RBC AUTO-ENTMCNC: 34.6 G/DL (ref 33.7–35.3)
MCV RBC AUTO: 94 FL (ref 81.4–97.8)
MONOCYTES # BLD AUTO: 0.68 K/UL (ref 0–0.85)
MONOCYTES NFR BLD AUTO: 8.1 % (ref 0–13.4)
NEUTROPHILS # BLD AUTO: 5.68 K/UL (ref 1.82–7.42)
NEUTROPHILS NFR BLD: 67.3 % (ref 44–72)
NRBC # BLD AUTO: 0 K/UL
NRBC BLD-RTO: 0 /100 WBC
PLATELET # BLD AUTO: 175 K/UL (ref 164–446)
PMV BLD AUTO: 10.2 FL (ref 9–12.9)
POTASSIUM SERPL-SCNC: 3.6 MMOL/L (ref 3.6–5.5)
RBC # BLD AUTO: 3.97 M/UL (ref 4.7–6.1)
SODIUM SERPL-SCNC: 132 MMOL/L (ref 135–145)
WBC # BLD AUTO: 8.4 K/UL (ref 4.8–10.8)

## 2021-03-03 PROCEDURE — 97535 SELF CARE MNGMENT TRAINING: CPT

## 2021-03-03 PROCEDURE — 700102 HCHG RX REV CODE 250 W/ 637 OVERRIDE(OP): Performed by: SURGERY

## 2021-03-03 PROCEDURE — 82962 GLUCOSE BLOOD TEST: CPT | Mod: 91

## 2021-03-03 PROCEDURE — 700102 HCHG RX REV CODE 250 W/ 637 OVERRIDE(OP): Performed by: NURSE PRACTITIONER

## 2021-03-03 PROCEDURE — 700111 HCHG RX REV CODE 636 W/ 250 OVERRIDE (IP): Performed by: NURSE PRACTITIONER

## 2021-03-03 PROCEDURE — A9270 NON-COVERED ITEM OR SERVICE: HCPCS | Performed by: SURGERY

## 2021-03-03 PROCEDURE — 85025 COMPLETE CBC W/AUTO DIFF WBC: CPT

## 2021-03-03 PROCEDURE — RXMED WILLOW AMBULATORY MEDICATION CHARGE: Performed by: NURSE PRACTITIONER

## 2021-03-03 PROCEDURE — 97161 PT EVAL LOW COMPLEX 20 MIN: CPT

## 2021-03-03 PROCEDURE — 36415 COLL VENOUS BLD VENIPUNCTURE: CPT

## 2021-03-03 PROCEDURE — A9270 NON-COVERED ITEM OR SERVICE: HCPCS | Performed by: NURSE PRACTITIONER

## 2021-03-03 PROCEDURE — 80048 BASIC METABOLIC PNL TOTAL CA: CPT

## 2021-03-03 RX ORDER — SCOLOPAMINE TRANSDERMAL SYSTEM 1 MG/1
1 PATCH, EXTENDED RELEASE TRANSDERMAL
Qty: 4 PATCH | Refills: 0 | Status: SHIPPED | OUTPATIENT
Start: 2021-03-06

## 2021-03-03 RX ORDER — SCOLOPAMINE TRANSDERMAL SYSTEM 1 MG/1
1 PATCH, EXTENDED RELEASE TRANSDERMAL
Status: DISCONTINUED | OUTPATIENT
Start: 2021-03-03 | End: 2021-03-03 | Stop reason: HOSPADM

## 2021-03-03 RX ORDER — BLOOD SUGAR DIAGNOSTIC
STRIP MISCELLANEOUS
Qty: 100 STRIP | Refills: 0 | Status: SHIPPED | OUTPATIENT
Start: 2021-03-03

## 2021-03-03 RX ORDER — BUTALBITAL, ACETAMINOPHEN AND CAFFEINE 50; 325; 40 MG/1; MG/1; MG/1
1 TABLET ORAL EVERY 6 HOURS PRN
Qty: 28 TABLET | Refills: 0 | Status: SHIPPED | OUTPATIENT
Start: 2021-03-03 | End: 2021-03-10

## 2021-03-03 RX ORDER — TAMSULOSIN HYDROCHLORIDE 0.4 MG/1
0.4 CAPSULE ORAL
Qty: 14 CAPSULE | Refills: 0 | Status: SHIPPED | OUTPATIENT
Start: 2021-03-04 | End: 2021-03-18

## 2021-03-03 RX ORDER — GLUCOSAMINE HCL/CHONDROITIN SU 500-400 MG
CAPSULE ORAL
Qty: 100 EACH | Refills: 0 | Status: SHIPPED | OUTPATIENT
Start: 2021-03-03

## 2021-03-03 RX ADMIN — BUTALBITAL, ACETAMINOPHEN, AND CAFFEINE 1 TABLET: 50; 325; 40 TABLET, COATED ORAL at 04:42

## 2021-03-03 RX ADMIN — CHLORDIAZEPOXIDE HYDROCHLORIDE 25 MG: 25 CAPSULE ORAL at 04:42

## 2021-03-03 RX ADMIN — SCOPALAMINE 1 PATCH: 1 PATCH, EXTENDED RELEASE TRANSDERMAL at 09:59

## 2021-03-03 RX ADMIN — TAMSULOSIN HYDROCHLORIDE 0.4 MG: 0.4 CAPSULE ORAL at 09:59

## 2021-03-03 RX ADMIN — CHLORDIAZEPOXIDE HYDROCHLORIDE 25 MG: 25 CAPSULE ORAL at 12:08

## 2021-03-03 RX ADMIN — ENOXAPARIN SODIUM 30 MG: 30 INJECTION SUBCUTANEOUS at 04:42

## 2021-03-03 ASSESSMENT — ENCOUNTER SYMPTOMS
ABDOMINAL PAIN: 0
HEADACHES: 0
BACK PAIN: 0
SPEECH CHANGE: 0
CHILLS: 0
SORE THROAT: 0
DOUBLE VISION: 0
SHORTNESS OF BREATH: 0
NAUSEA: 0
NECK PAIN: 0
FOCAL WEAKNESS: 0
FEVER: 0
BLURRED VISION: 0
MYALGIAS: 1
VOMITING: 0

## 2021-03-03 ASSESSMENT — COGNITIVE AND FUNCTIONAL STATUS - GENERAL
MOBILITY SCORE: 24
SUGGESTED CMS G CODE MODIFIER MOBILITY: CH

## 2021-03-03 ASSESSMENT — GAIT ASSESSMENTS
DEVIATION: NO DEVIATION
GAIT LEVEL OF ASSIST: SUPERVISED
DISTANCE (FEET): 300

## 2021-03-03 NOTE — THERAPY
Physical Therapy   Initial Evaluation     Patient Name: Tico Muñoz  Age:  40 y.o., Sex:  male  Medical Record #: [id number]  Today's Date: 3/3/2021     Precautions: Fall Risk    Assessment  Patient is 40 y.o. male with a diagnosis of SAH.  Patient demonstrates community level functional mobility, not a fall risk on DGI.  However, patient does have mild attention and vestibular deficits that would appropriately would be address with outpatient PT.  Significant education provided to family and patient about role of outpatient PT, importance of OP PT, fall risk due to vestibular dysfunction, and potential setbacks depending on patient's response to increased stimuli.  Currently patient has low tolerance to vestibular training, and would need to begin training soon to regain function.  PT will not follow.     Plan    Recommend Physical Therapy for Evaluation only     DC Equipment Recommendations: None  Discharge Recommendations: Recommend outpatient physical therapy services to address higher level deficits     Objective       03/03/21 1159   Precautions   Precautions Fall Risk   Prior Living Situation   Prior Services None   Housing / Facility 1 Story House   Steps Into Home 0   Steps In Home 0   Equipment Owned None   Lives with - Patient's Self Care Capacity Spouse;Child Less than 18 Years of Age  (3 kids - 9,14,17)   Comments works as    Prior Level of Functional Mobility   Bed Mobility Independent   Transfer Status Independent   Ambulation Independent   Distance Ambulation (Feet) 150   Assistive Devices Used None   Stairs Independent   Cognition    Cognition / Consciousness WDL   Orientation Level Oriented x 4   Level of Consciousness Alert   Comments may have mild higher level deficits - defer to OT   Passive ROM Lower Body   Passive ROM Lower Body WDL   Active ROM Lower Body    Active ROM Lower Body  WDL   Strength Lower Body   Lower Body Strength  WDL   Sensation Lower Body   Lower Extremity Sensation    WDL   Neurological Concerns   Neurological Concerns Yes   Comments + CTSIB, X1 testing, mild FTN coordination deficits, and positive LOB with head turns.  Vestibular deficits present   Balance Assessment   Sitting Balance (Static) Good   Sitting Balance (Dynamic) Good   Standing Balance (Static) Good   Standing Balance (Dynamic) Good   Weight Shift Sitting Good   Weight Shift Standing Good   Comments not fall risk according to DGI   Gait Analysis   Gait Level Of Assist Supervised   Assistive Device None   Distance (Feet) 300   # of Times Distance was Traveled 1   Deviation No deviation   Weight Bearing Status fwb   Comments 1 mild LOB, able to catch with head turns with rotation   Bed Mobility    Supine to Sit Supervised   Sit to Supine Supervised   Scooting Supervised   Functional Mobility   Sit to Stand Supervised   How much difficulty does the patient currently have...   Turning over in bed (including adjusting bedclothes, sheets and blankets)? 4   Sitting down on and standing up from a chair with arms (e.g., wheelchair, bedside commode, etc.) 4   Moving from lying on back to sitting on the side of the bed? 4   How much help from another person does the patient currently need...   Moving to and from a bed to a chair (including a wheelchair)? 4   Need to walk in a hospital room? 4   Climbing 3-5 steps with a railing? 4   6 clicks Mobility Score 24   Education Group   Education Provided Role of Physical Therapist;Gait Training  (need of outpatient PT for vestibular and concussion)   Role of Physical Therapist Patient Response Patient;Family;Acceptance;Explanation;Demonstration;Verbal Demonstration;Action Demonstration   Gait Training Patient Response Patient;Family;Acceptance;Explanation;Demonstration;Verbal Demonstration;Action Demonstration   Anticipated Discharge Equipment and Recommendations   DC Equipment Recommendations None

## 2021-03-03 NOTE — DISCHARGE PLANNING
Anticipated Discharge Disposition:   Home  Meds to Beds  Outpatient therapies, PT & SLP    Action:    RN CATHLEEN spoke to patient and spouse at bedside.  Pt does not have a PCP.  They were agreeable to RN CATHLEEN scheduling them for a post hospital appointment with an available provider in [place].  RN CM scheduled an appointment for tomorrow at the [facility] at 1350 on 3-4-2021 with OLGA Dominique.  Information placed in AVS.    Voalte msg to Lin BORJA and she will meet with patient today.    Barriers to Discharge:    Medical clearance    Plan:    Provide transitional care coordination.

## 2021-03-03 NOTE — PROGRESS NOTES
Trauma / Surgical Daily Progress Note    Date of Service  3/3/2021    Chief Complaint  40 y.o. male admitted 2/28/2021 with SAD, alcohol intoxication, hematuria and elevated blood glucose    Interval Events  Transfer from ICU to Neuro  GCS 15, headache, some dizziness  SLP recommendations reviewed  HemA1C 8.3, Na 132    - Fluid restriction initiated  - Trial ovalles removal  - PT/OT pending  - DM education/recommendations pending  - Scopolamine patch added  - Dispo: DC home with family in [place] when medically cleared  - No PCP listed in system, will discuss with CM    Review of Systems  Review of Systems   Constitutional: Negative for chills and fever.   HENT: Negative for hearing loss, sore throat and tinnitus.    Eyes: Negative for blurred vision and double vision.   Respiratory: Negative for shortness of breath.    Cardiovascular: Negative for chest pain.   Gastrointestinal: Negative for abdominal pain, nausea and vomiting.   Genitourinary: Negative for dysuria, hematuria and urgency.   Musculoskeletal: Positive for myalgias. Negative for back pain, joint pain and neck pain.   Neurological: Negative for speech change, focal weakness and headaches.        Vital Signs  Temp:  [36.3 °C (97.3 °F)-37.2 °C (98.9 °F)] 36.3 °C (97.3 °F)  Pulse:  [] 93  Resp:  [13-22] 16  BP: (132-190)/() 135/84  SpO2:  [93 %-97 %] 96 %    Physical Exam  Physical Exam  Vitals and nursing note reviewed.   Constitutional:       General: He is awake. He is not in acute distress.     Appearance: Normal appearance. He is not ill-appearing, toxic-appearing or diaphoretic.   HENT:      Head:      Comments: Posterior scalp laceration      Nose: Nose normal.      Mouth/Throat:      Mouth: Mucous membranes are moist.      Pharynx: Oropharynx is clear.   Eyes:      General: No scleral icterus.     Extraocular Movements: Extraocular movements intact.      Conjunctiva/sclera: Conjunctivae normal.   Cardiovascular:      Rate and Rhythm:  Normal rate and regular rhythm.      Pulses: Normal pulses.   Pulmonary:      Effort: Pulmonary effort is normal. No tachypnea, accessory muscle usage or respiratory distress.      Breath sounds: Normal breath sounds. No stridor or decreased air movement. No wheezing or rales.   Chest:      Chest wall: No tenderness.   Abdominal:      General: There is no distension.      Palpations: Abdomen is soft.      Tenderness: There is no abdominal tenderness.   Genitourinary:     Comments: Eldridge, no hematuria  Musculoskeletal:         General: No swelling, deformity or signs of injury.      Cervical back: Normal range of motion and neck supple. No tenderness.   Skin:     General: Skin is warm and dry.      Coloration: Skin is not jaundiced or pale.   Neurological:      General: No focal deficit present.      Mental Status: He is alert and oriented to person, place, and time.   Psychiatric:         Mood and Affect: Mood normal.         Behavior: Behavior normal. Behavior is cooperative.         Laboratory  Recent Results (from the past 24 hour(s))   ACCU-CHEK GLUCOSE    Collection Time: 03/02/21 12:21 PM   Result Value Ref Range    Glucose - Accu-Ck 207 (H) 65 - 99 mg/dL   ACCU-CHEK GLUCOSE    Collection Time: 03/02/21  4:50 PM   Result Value Ref Range    Glucose - Accu-Ck 183 (H) 65 - 99 mg/dL   ACCU-CHEK GLUCOSE    Collection Time: 03/02/21  8:34 PM   Result Value Ref Range    Glucose - Accu-Ck 199 (H) 65 - 99 mg/dL   CBC WITH DIFFERENTIAL    Collection Time: 03/03/21  3:18 AM   Result Value Ref Range    WBC 8.4 4.8 - 10.8 K/uL    RBC 3.97 (L) 4.70 - 6.10 M/uL    Hemoglobin 12.9 (L) 14.0 - 18.0 g/dL    Hematocrit 37.3 (L) 42.0 - 52.0 %    MCV 94.0 81.4 - 97.8 fL    MCH 32.5 27.0 - 33.0 pg    MCHC 34.6 33.7 - 35.3 g/dL    RDW 40.6 35.9 - 50.0 fL    Platelet Count 175 164 - 446 K/uL    MPV 10.2 9.0 - 12.9 fL    Neutrophils-Polys 67.30 44.00 - 72.00 %    Lymphocytes 22.40 22.00 - 41.00 %    Monocytes 8.10 0.00 - 13.40 %     Eosinophils 1.20 0.00 - 6.90 %    Basophils 0.50 0.00 - 1.80 %    Immature Granulocytes 0.50 0.00 - 0.90 %    Nucleated RBC 0.00 /100 WBC    Neutrophils (Absolute) 5.68 1.82 - 7.42 K/uL    Lymphs (Absolute) 1.89 1.00 - 4.80 K/uL    Monos (Absolute) 0.68 0.00 - 0.85 K/uL    Eos (Absolute) 0.10 0.00 - 0.51 K/uL    Baso (Absolute) 0.04 0.00 - 0.12 K/uL    Immature Granulocytes (abs) 0.04 0.00 - 0.11 K/uL    NRBC (Absolute) 0.00 K/uL   Basic Metabolic Panel    Collection Time: 03/03/21  3:18 AM   Result Value Ref Range    Sodium 132 (L) 135 - 145 mmol/L    Potassium 3.6 3.6 - 5.5 mmol/L    Chloride 99 96 - 112 mmol/L    Co2 25 20 - 33 mmol/L    Glucose 144 (H) 65 - 99 mg/dL    Bun 8 8 - 22 mg/dL    Creatinine 0.76 0.50 - 1.40 mg/dL    Calcium 8.5 8.5 - 10.5 mg/dL    Anion Gap 8.0 7.0 - 16.0   ESTIMATED GFR    Collection Time: 03/03/21  3:18 AM   Result Value Ref Range    GFR If African American >60 >60 mL/min/1.73 m 2    GFR If Non African American >60 >60 mL/min/1.73 m 2   ACCU-CHEK GLUCOSE    Collection Time: 03/03/21  8:25 AM   Result Value Ref Range    Glucose - Accu-Ck 157 (H) 65 - 99 mg/dL       Fluids    Intake/Output Summary (Last 24 hours) at 3/3/2021 0912  Last data filed at 3/3/2021 0800  Gross per 24 hour   Intake 600 ml   Output 4330 ml   Net -3730 ml       Core Measures & Quality Metrics  Labs reviewed, Medications reviewed and Radiology images reviewed  [place]valles Catheter: Trial ovalles removal.      DVT Prophylaxis: Enoxaparin (Lovenox)  DVT prophylaxis - mechanical: SCDs  Ulcer prophylaxis: Not indicated    Assessed for rehab: Patient returned to prior level of function, rehabilitation not indicated at this time    RAP Score Total: 7    ETOH Screening  CAGE Score: 1  Assessment complete date: 3/2/2021  Intervention: yes. Patient response to intervention: 8 pack of beer of every other day.   Patient demonstrates understanding of intervention. Patient does not agree to follow-up.   has not been  contacted. Follow up with: PCP, [facility]  Total ETOH intervention time: 15 - 30 mintues      Assessment/Plan  Subarachnoid hemorrhage following injury (HCC)- (present on admission)  Assessment & Plan  Subtle subarachnoid hemorrhage or minimal contusion in left vertex.  Non-operative management.   Follow up CTA of brain with slightly more pronounced SAH  3/1 Post traumatic pharmacologic seizure prophylaxis not needed  Speech Language Pathology cognitive evaluation recommending outpatient therapies.   No need for follow up with neurosurgery as outpatient.   Porfirio Lea MD. Neurosurgeon. [facility].    Hyponatremia- (present on admission)  Assessment & Plan  Na 132  Presented hyponatremic  Chronic alcohol use  Fluid restriction  Trend Na    Hematuria- (present on admission)  Assessment & Plan  Hematuria post ovalles insertion.  3/1 CT abdomen/pelvis with normal ureters and unremarkable kidneys.  3/2 Continue ovalles catheter  3/3 No hematuria, dc ovalles    Diabetes (HCC)- (present on admission)  Assessment & Plan  Admission plasma glucose 349.; history unknown.  Insulin sliding scale  3/2 Glycohemoglobin 8.3.   Diabetic diet. Diabetic educator/nutritionist consult.     Acute alcohol intoxication (HCC)- (present on admission)  Assessment & Plan  Admission blood alcohol level of 325.9.  Trauma alcohol withdrawal protocol initiated.  Alcohol withdrawal surveillance.     No contraindication to deep vein thrombosis (DVT) prophylaxis- (present on admission)  Assessment & Plan  Prophylactic anticoagulation for thrombotic prevention initially contraindicated secondary to elevated bleeding risk.  3/2 Pharmacological DVT prophylaxis cleared by neurosurgery, initiate.     Scalp laceration, initial encounter- (present on admission)  Assessment & Plan  Posterior scalp hematoma and laceration at the left vertex.  Sutured in ICU.  Suture removal in 7 days (3/7)    Screening examination for infectious  disease- (present on admission)  Assessment & Plan  Two SARS-CoV-2 tests negative. Isolation precautions de-escalated.    Trauma- (present on admission)  Assessment & Plan  Ground level fall.  Trauma Red Activation.  Michael Clark MD. Trauma Surgery.         Discussed patient condition with RN, Patient and trauma surgery. Dr. Clark    Patient seen, data reviewed and discussed.  Agree with assessment and plan.         Michael Clark MD  [phone]
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
## 2021-03-03 NOTE — PROGRESS NOTES
Significant events overnight: No signs of withdrawals. Patient complained this AM he was not able to sleep and asking if he can get something to help him sleep tonight.    Neuro status: AOx4    Cardiac: No tele monitor    Pain: Constant ache on left side of head, near staples, down to jaw line. Some pain relief from Oxy 5 and Fioricet.     Bladder/Bowel: Eldridge in place, orange-tinted output, no visible clots. No BM overnight.    Diet: Diabetic    Mobility: x1 standby

## 2021-03-03 NOTE — CARE PLAN
Problem: Safety  Goal: Will remain free from injury  Outcome: PROGRESSING AS EXPECTED  Note: Reviewed patient's mobility status due to deficits, discussed with care team of patient needs, verifying appropriate safety precautions in place, providing patient education, ensuring call lights are within reach, non-slip socks in use, evaluating needs alarms & monitoring every shift, continuing with current plan of care.       Problem: Knowledge Deficit  Goal: Knowledge of disease process/condition, treatment plan, diagnostic tests, and medications will improve  Outcome: PROGRESSING AS EXPECTED  Note: Educated patient about current POC, activities, encouraging patient to ask questions regarding care plan, providing answers to patient's questions, educating patient about medications, encouraging patient involvement in care process. Continuing with current POC.

## 2021-03-04 NOTE — DISCHARGE PLANNING
Meds-to-Beds: Discharge prescription orders listed below delivered to patient's bedside. HARMAN Carrion and JONH Ceballos notified. Patient counseled.       Flores Muñozo   Home Medication Instructions TRANG:[id number]    Printed on:03/03/21 4952   Medication Information                      Alcohol Swabs  Wipe site with prep pad prior to injection.             butalbital/apap/caffeine -40 mg (FIORICET) -40 MG Tab  Take 1 tablet by mouth every 6 hours as needed for Headache for up to 7 days.             glucose blood (ONETOUCH VERIO) strip  use to test blood sugar once daily             Lancets 30G Misc  use to test blood sugar once daily             scopolamine (TRANSDERM-SCOP) 1 mg/72hr PATCH 72 HR  Place 1 Patch on the skin every 72 hours.             tamsulosin (FLOMAX) 0.4 MG capsule  Take 1 capsule by mouth 1/2 hour after breakfast for 14 days.                 Ary Campbell, PharmD
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
## 2021-03-04 NOTE — DISCHARGE INSTRUCTIONS
Discharge Instructions    Discharged to home by car with relative. Discharged via wheelchair, hospital escort: Yes.  Special equipment needed: Not Applicable    Be sure to schedule a follow-up appointment with your primary care doctor or any specialists as instructed.     Discharge Plan:   Diet Plan: Discussed  Activity Level: Discussed  Confirmed Follow up Appointment: Patient to Call and Schedule Appointment  Confirmed Symptoms Management: Discussed  Medication Reconciliation Updated: Yes  Influenza Vaccine Indication: Indicated: 9 to 64 years of age  Influenza Vaccine Given - only chart on this line when given: Influenza Vaccine Given (See MAR)    I understand that a diet low in cholesterol, fat, and sodium is recommended for good health. Unless I have been given specific instructions below for another diet, I accept this instruction as my diet prescription.   Other diet: regular diabetic    Special Instructions: None    · Is patient discharged on Warfarin / Coumadin?   No     Depression / Suicide Risk    As you are discharged from this [facility] facility, it is important to learn how to keep safe from harming yourself.    Recognize the warning signs:  · Abrupt changes in personality, positive or negative- including increase in energy   · Giving away possessions  · Change in eating patterns- significant weight changes-  positive or negative  · Change in sleeping patterns- unable to sleep or sleeping all the time   · Unwillingness or inability to communicate  · Depression  · Unusual sadness, discouragement and loneliness  · Talk of wanting to die  · Neglect of personal appearance   · Rebelliousness- reckless behavior  · Withdrawal from people/activities they love  · Confusion- inability to concentrate     If you or a loved one observes any of these behaviors or has concerns about self-harm, here's what you can do:  · Talk about it- your feelings and reasons for harming yourself  · Remove any means that you  might use to hurt yourself (examples: pills, rope, extension cords, firearm)  · Get professional help from the community (Mental Health, Substance Abuse, psychological counseling)  · Do not be alone:Call your Safe Contact- someone whom you trust who will be there for you.  · Call your local CRISIS HOTLINE 921-1245 or 922-693-8631  · Call your local Children's Mobile Crisis Response Team [place] (528) 439-2005 or www.[employer]  · Call the toll free National Suicide Prevention Hotlines   · National Suicide Prevention Lifeline 178-779-VNGI (3380)  · National Hope Line Network 800-SUICIDE (779-6125)

## 2021-03-04 NOTE — CONSULTS
Diabetes education: Met with pt and wife prior to discharge. Pt is newly dx with diabetes. Blood sugar on admission was 349 and Hg a1c was 8.3%.  Pt was on regular insulin sliding scale coverage ac and hs with blood sugars of  199 ( 2 units), 157 ( 2 units) and 175 ( 2 units).   Discussed what diabetes was,   type two, hypoglycemia,   Hyperglycemia,  sick day, and  goals for blood sugars.  Discussed need for carbohydrates and proteins, with every meal and snack as well as why. Discussed the importance of the HS snack with a carbohydrate and protein. Discussed need, benefit and precautions with exercise.  Discussed  what effects blood sugars. Discussed need to follow up with PCP. Pt has an appointment with a new PCP tomorrow.    Discussed metformin and other oral agents. APN will defer starting oral medications to new PCP.  CDE gave and instructed on One touch verio reflect meter and frequency of testing ( 1x day rotating ac and hs starting after visit tomorrow. Until then ac dinner, bed and in am so PCP has values to look out). Pt received supplies from [facility] via [facility] to beds. Questions answered. Pt has a [facility] book and number for CDE.